# Patient Record
Sex: MALE | Race: WHITE | NOT HISPANIC OR LATINO | Employment: OTHER | URBAN - METROPOLITAN AREA
[De-identification: names, ages, dates, MRNs, and addresses within clinical notes are randomized per-mention and may not be internally consistent; named-entity substitution may affect disease eponyms.]

---

## 2017-01-24 ENCOUNTER — COMMUNICATION - HEALTHEAST (OUTPATIENT)
Dept: ENDOCRINOLOGY | Facility: CLINIC | Age: 62
End: 2017-01-24

## 2017-01-24 DIAGNOSIS — E89.0 OTHER POSTABLATIVE HYPOTHYROIDISM: ICD-10-CM

## 2017-02-23 ENCOUNTER — COMMUNICATION - HEALTHEAST (OUTPATIENT)
Dept: ENDOCRINOLOGY | Facility: CLINIC | Age: 62
End: 2017-02-23

## 2017-02-23 DIAGNOSIS — E89.0 OTHER POSTABLATIVE HYPOTHYROIDISM: ICD-10-CM

## 2017-03-01 ENCOUNTER — COMMUNICATION - HEALTHEAST (OUTPATIENT)
Dept: ENDOCRINOLOGY | Facility: CLINIC | Age: 62
End: 2017-03-01

## 2017-03-01 DIAGNOSIS — E89.0 OTHER POSTABLATIVE HYPOTHYROIDISM: ICD-10-CM

## 2017-03-03 ENCOUNTER — COMMUNICATION - HEALTHEAST (OUTPATIENT)
Dept: ENDOCRINOLOGY | Facility: CLINIC | Age: 62
End: 2017-03-03

## 2017-03-03 DIAGNOSIS — E89.0 OTHER POSTABLATIVE HYPOTHYROIDISM: ICD-10-CM

## 2017-03-10 ENCOUNTER — OFFICE VISIT - HEALTHEAST (OUTPATIENT)
Dept: ENDOCRINOLOGY | Facility: CLINIC | Age: 62
End: 2017-03-10

## 2017-03-10 DIAGNOSIS — E05.00 TOXIC DIFFUSE GOITER: ICD-10-CM

## 2017-03-10 DIAGNOSIS — E89.0 OTHER POSTABLATIVE HYPOTHYROIDISM: ICD-10-CM

## 2017-03-10 LAB
CHOLEST SERPL-MCNC: 159 MG/DL
FASTING STATUS PATIENT QL REPORTED: YES
HBA1C MFR BLD: 5.5 % (ref 3.5–6)
HDLC SERPL-MCNC: 33 MG/DL
LDLC SERPL CALC-MCNC: 114 MG/DL
TRIGL SERPL-MCNC: 61 MG/DL

## 2017-03-17 ENCOUNTER — COMMUNICATION - HEALTHEAST (OUTPATIENT)
Dept: ENDOCRINOLOGY | Facility: CLINIC | Age: 62
End: 2017-03-17

## 2017-10-10 ENCOUNTER — RECORDS - HEALTHEAST (OUTPATIENT)
Dept: LAB | Facility: CLINIC | Age: 62
End: 2017-10-10

## 2017-10-10 LAB
CHOLEST SERPL-MCNC: 171 MG/DL
FASTING STATUS PATIENT QL REPORTED: ABNORMAL
HDLC SERPL-MCNC: 34 MG/DL
LDLC SERPL CALC-MCNC: 92 MG/DL
PSA SERPL-MCNC: 0.3 NG/ML (ref 0–4.5)
TRIGL SERPL-MCNC: 223 MG/DL

## 2017-12-13 ENCOUNTER — COMMUNICATION - HEALTHEAST (OUTPATIENT)
Dept: ENDOCRINOLOGY | Facility: CLINIC | Age: 62
End: 2017-12-13

## 2017-12-13 ENCOUNTER — AMBULATORY - HEALTHEAST (OUTPATIENT)
Dept: ENDOCRINOLOGY | Facility: CLINIC | Age: 62
End: 2017-12-13

## 2017-12-13 DIAGNOSIS — E89.0 POSTABLATIVE HYPOTHYROIDISM: ICD-10-CM

## 2017-12-13 DIAGNOSIS — E05.00 TOXIC DIFFUSE GOITER: ICD-10-CM

## 2017-12-19 ENCOUNTER — AMBULATORY - HEALTHEAST (OUTPATIENT)
Dept: LAB | Facility: CLINIC | Age: 62
End: 2017-12-19

## 2017-12-19 DIAGNOSIS — E89.0 POSTABLATIVE HYPOTHYROIDISM: ICD-10-CM

## 2017-12-21 ENCOUNTER — AMBULATORY - HEALTHEAST (OUTPATIENT)
Dept: ENDOCRINOLOGY | Facility: CLINIC | Age: 62
End: 2017-12-21

## 2017-12-21 DIAGNOSIS — E89.0 POSTABLATIVE HYPOTHYROIDISM: ICD-10-CM

## 2017-12-29 ENCOUNTER — COMMUNICATION - HEALTHEAST (OUTPATIENT)
Dept: ENDOCRINOLOGY | Facility: CLINIC | Age: 62
End: 2017-12-29

## 2017-12-29 ENCOUNTER — AMBULATORY - HEALTHEAST (OUTPATIENT)
Dept: ENDOCRINOLOGY | Facility: CLINIC | Age: 62
End: 2017-12-29

## 2017-12-29 DIAGNOSIS — E89.0 POSTABLATIVE HYPOTHYROIDISM: ICD-10-CM

## 2018-01-05 ENCOUNTER — COMMUNICATION - HEALTHEAST (OUTPATIENT)
Dept: ENDOCRINOLOGY | Facility: CLINIC | Age: 63
End: 2018-01-05

## 2018-01-05 DIAGNOSIS — E89.0 POSTABLATIVE HYPOTHYROIDISM: ICD-10-CM

## 2018-02-16 ENCOUNTER — COMMUNICATION - HEALTHEAST (OUTPATIENT)
Dept: ENDOCRINOLOGY | Facility: CLINIC | Age: 63
End: 2018-02-16

## 2018-02-27 ENCOUNTER — AMBULATORY - HEALTHEAST (OUTPATIENT)
Dept: LAB | Facility: CLINIC | Age: 63
End: 2018-02-27

## 2018-02-27 DIAGNOSIS — E89.0 POSTABLATIVE HYPOTHYROIDISM: ICD-10-CM

## 2018-02-27 LAB
T4 FREE SERPL-MCNC: 1.2 NG/DL (ref 0.7–1.8)
TSH SERPL DL<=0.005 MIU/L-ACNC: 1.71 UIU/ML (ref 0.3–5)

## 2018-03-07 ENCOUNTER — OFFICE VISIT - HEALTHEAST (OUTPATIENT)
Dept: ENDOCRINOLOGY | Facility: CLINIC | Age: 63
End: 2018-03-07

## 2018-03-07 DIAGNOSIS — E05.00 TOXIC DIFFUSE GOITER: ICD-10-CM

## 2018-03-07 DIAGNOSIS — E89.0 POSTABLATIVE HYPOTHYROIDISM: ICD-10-CM

## 2018-07-11 ENCOUNTER — AMBULATORY - HEALTHEAST (OUTPATIENT)
Dept: LAB | Facility: CLINIC | Age: 63
End: 2018-07-11

## 2018-07-11 DIAGNOSIS — E89.0 POSTABLATIVE HYPOTHYROIDISM: ICD-10-CM

## 2018-07-11 LAB
T4 FREE SERPL-MCNC: 1 NG/DL (ref 0.7–1.8)
TSH SERPL DL<=0.005 MIU/L-ACNC: 1.06 UIU/ML (ref 0.3–5)

## 2018-07-13 ENCOUNTER — COMMUNICATION - HEALTHEAST (OUTPATIENT)
Dept: ENDOCRINOLOGY | Facility: CLINIC | Age: 63
End: 2018-07-13

## 2018-07-31 ENCOUNTER — RECORDS - HEALTHEAST (OUTPATIENT)
Dept: LAB | Facility: CLINIC | Age: 63
End: 2018-07-31

## 2018-07-31 LAB
ALBUMIN SERPL-MCNC: 4.4 G/DL (ref 3.5–5)
ALP SERPL-CCNC: 43 U/L (ref 45–120)
ALT SERPL W P-5'-P-CCNC: 36 U/L (ref 0–45)
ANION GAP SERPL CALCULATED.3IONS-SCNC: 10 MMOL/L (ref 5–18)
AST SERPL W P-5'-P-CCNC: 27 U/L (ref 0–40)
BILIRUB SERPL-MCNC: 0.9 MG/DL (ref 0–1)
BUN SERPL-MCNC: 15 MG/DL (ref 8–22)
CALCIUM SERPL-MCNC: 9.7 MG/DL (ref 8.5–10.5)
CHLORIDE BLD-SCNC: 107 MMOL/L (ref 98–107)
CHOLEST SERPL-MCNC: 172 MG/DL
CO2 SERPL-SCNC: 24 MMOL/L (ref 22–31)
CREAT SERPL-MCNC: 0.86 MG/DL (ref 0.7–1.3)
FASTING STATUS PATIENT QL REPORTED: ABNORMAL
GFR SERPL CREATININE-BSD FRML MDRD: >60 ML/MIN/1.73M2
GLUCOSE BLD-MCNC: 98 MG/DL (ref 70–125)
HDLC SERPL-MCNC: 37 MG/DL
LDLC SERPL CALC-MCNC: 118 MG/DL
POTASSIUM BLD-SCNC: 3.8 MMOL/L (ref 3.5–5)
PROT SERPL-MCNC: 7.5 G/DL (ref 6–8)
SODIUM SERPL-SCNC: 141 MMOL/L (ref 136–145)
TRIGL SERPL-MCNC: 85 MG/DL
TSH SERPL DL<=0.005 MIU/L-ACNC: 1.01 UIU/ML (ref 0.3–5)

## 2018-12-18 ENCOUNTER — COMMUNICATION - HEALTHEAST (OUTPATIENT)
Dept: ENDOCRINOLOGY | Facility: CLINIC | Age: 63
End: 2018-12-18

## 2018-12-18 DIAGNOSIS — E89.0 POSTABLATIVE HYPOTHYROIDISM: ICD-10-CM

## 2018-12-18 RX ORDER — ALBUTEROL SULFATE 90 UG/1
AEROSOL, METERED RESPIRATORY (INHALATION)
Refills: 0 | Status: SHIPPED | COMMUNITY
Start: 2018-11-01 | End: 2023-01-05

## 2019-02-28 ENCOUNTER — COMMUNICATION - HEALTHEAST (OUTPATIENT)
Dept: LAB | Facility: CLINIC | Age: 64
End: 2019-02-28

## 2019-02-28 ENCOUNTER — AMBULATORY - HEALTHEAST (OUTPATIENT)
Dept: ENDOCRINOLOGY | Facility: CLINIC | Age: 64
End: 2019-02-28

## 2019-02-28 DIAGNOSIS — E89.0 POSTABLATIVE HYPOTHYROIDISM: ICD-10-CM

## 2019-04-19 ENCOUNTER — COMMUNICATION - HEALTHEAST (OUTPATIENT)
Dept: ENDOCRINOLOGY | Facility: CLINIC | Age: 64
End: 2019-04-19

## 2019-04-19 DIAGNOSIS — E89.0 POSTABLATIVE HYPOTHYROIDISM: ICD-10-CM

## 2019-04-22 ENCOUNTER — AMBULATORY - HEALTHEAST (OUTPATIENT)
Dept: LAB | Facility: CLINIC | Age: 64
End: 2019-04-22

## 2019-04-22 DIAGNOSIS — E89.0 POSTABLATIVE HYPOTHYROIDISM: ICD-10-CM

## 2019-04-22 LAB
T4 FREE SERPL-MCNC: 1 NG/DL (ref 0.7–1.8)
TSH SERPL DL<=0.005 MIU/L-ACNC: 2.24 UIU/ML (ref 0.3–5)

## 2019-04-25 ENCOUNTER — OFFICE VISIT - HEALTHEAST (OUTPATIENT)
Dept: ENDOCRINOLOGY | Facility: CLINIC | Age: 64
End: 2019-04-25

## 2019-04-25 DIAGNOSIS — E89.0 POSTABLATIVE HYPOTHYROIDISM: ICD-10-CM

## 2019-04-25 ASSESSMENT — MIFFLIN-ST. JEOR: SCORE: 1876.27

## 2019-04-27 RX ORDER — TRAMADOL HYDROCHLORIDE 50 MG/1
50 TABLET ORAL 4 TIMES DAILY PRN
Refills: 0 | Status: SHIPPED | COMMUNITY
Start: 2018-12-28 | End: 2023-01-05

## 2019-05-02 ENCOUNTER — AMBULATORY - HEALTHEAST (OUTPATIENT)
Dept: ENDOCRINOLOGY | Facility: CLINIC | Age: 64
End: 2019-05-02

## 2019-05-02 DIAGNOSIS — E89.0 POSTABLATIVE HYPOTHYROIDISM: ICD-10-CM

## 2019-06-13 ENCOUNTER — AMBULATORY - HEALTHEAST (OUTPATIENT)
Dept: LAB | Facility: CLINIC | Age: 64
End: 2019-06-13

## 2019-06-13 DIAGNOSIS — E89.0 POSTABLATIVE HYPOTHYROIDISM: ICD-10-CM

## 2019-06-13 LAB
T4 FREE SERPL-MCNC: 0.9 NG/DL (ref 0.7–1.8)
TSH SERPL DL<=0.005 MIU/L-ACNC: 1.37 UIU/ML (ref 0.3–5)

## 2019-06-14 ENCOUNTER — COMMUNICATION - HEALTHEAST (OUTPATIENT)
Dept: ENDOCRINOLOGY | Facility: CLINIC | Age: 64
End: 2019-06-14

## 2019-07-23 ENCOUNTER — RECORDS - HEALTHEAST (OUTPATIENT)
Dept: LAB | Facility: CLINIC | Age: 64
End: 2019-07-23

## 2019-07-23 LAB
ALBUMIN SERPL-MCNC: 4.2 G/DL (ref 3.5–5)
ALP SERPL-CCNC: 45 U/L (ref 45–120)
ALT SERPL W P-5'-P-CCNC: 40 U/L (ref 0–45)
ANION GAP SERPL CALCULATED.3IONS-SCNC: 8 MMOL/L (ref 5–18)
AST SERPL W P-5'-P-CCNC: 23 U/L (ref 0–40)
BILIRUB SERPL-MCNC: 0.6 MG/DL (ref 0–1)
BUN SERPL-MCNC: 22 MG/DL (ref 8–22)
CALCIUM SERPL-MCNC: 9.2 MG/DL (ref 8.5–10.5)
CHLORIDE BLD-SCNC: 105 MMOL/L (ref 98–107)
CHOLEST SERPL-MCNC: 167 MG/DL
CO2 SERPL-SCNC: 26 MMOL/L (ref 22–31)
CREAT SERPL-MCNC: 0.87 MG/DL (ref 0.7–1.3)
FASTING STATUS PATIENT QL REPORTED: YES
GFR SERPL CREATININE-BSD FRML MDRD: >60 ML/MIN/1.73M2
GLUCOSE BLD-MCNC: 104 MG/DL (ref 70–125)
HDLC SERPL-MCNC: 35 MG/DL
LDLC SERPL CALC-MCNC: 114 MG/DL
POTASSIUM BLD-SCNC: 4 MMOL/L (ref 3.5–5)
PROT SERPL-MCNC: 7.2 G/DL (ref 6–8)
PSA SERPL-MCNC: 0.3 NG/ML (ref 0–4.5)
SODIUM SERPL-SCNC: 139 MMOL/L (ref 136–145)
TRIGL SERPL-MCNC: 89 MG/DL

## 2019-08-16 ENCOUNTER — RECORDS - HEALTHEAST (OUTPATIENT)
Dept: ADMINISTRATIVE | Facility: OTHER | Age: 64
End: 2019-08-16

## 2019-12-27 ENCOUNTER — RECORDS - HEALTHEAST (OUTPATIENT)
Dept: LAB | Facility: CLINIC | Age: 64
End: 2019-12-27

## 2019-12-27 LAB
ALBUMIN SERPL-MCNC: 4.3 G/DL (ref 3.5–5)
ALP SERPL-CCNC: 49 U/L (ref 45–120)
ALT SERPL W P-5'-P-CCNC: 49 U/L (ref 0–45)
ANION GAP SERPL CALCULATED.3IONS-SCNC: 10 MMOL/L (ref 5–18)
AST SERPL W P-5'-P-CCNC: 32 U/L (ref 0–40)
BILIRUB SERPL-MCNC: 0.7 MG/DL (ref 0–1)
BUN SERPL-MCNC: 13 MG/DL (ref 8–22)
CALCIUM SERPL-MCNC: 9.5 MG/DL (ref 8.5–10.5)
CHLORIDE BLD-SCNC: 105 MMOL/L (ref 98–107)
CHOLEST SERPL-MCNC: 165 MG/DL
CO2 SERPL-SCNC: 24 MMOL/L (ref 22–31)
CREAT SERPL-MCNC: 0.89 MG/DL (ref 0.7–1.3)
FASTING STATUS PATIENT QL REPORTED: ABNORMAL
GFR SERPL CREATININE-BSD FRML MDRD: >60 ML/MIN/1.73M2
GLUCOSE BLD-MCNC: 92 MG/DL (ref 70–125)
HDLC SERPL-MCNC: 28 MG/DL
LDLC SERPL CALC-MCNC: 101 MG/DL
POTASSIUM BLD-SCNC: 3.8 MMOL/L (ref 3.5–5)
PROT SERPL-MCNC: 7.6 G/DL (ref 6–8)
SODIUM SERPL-SCNC: 139 MMOL/L (ref 136–145)
TRIGL SERPL-MCNC: 181 MG/DL
TSH SERPL DL<=0.005 MIU/L-ACNC: 3.59 UIU/ML (ref 0.3–5)

## 2020-05-28 ENCOUNTER — COMMUNICATION - HEALTHEAST (OUTPATIENT)
Dept: ENDOCRINOLOGY | Facility: CLINIC | Age: 65
End: 2020-05-28

## 2020-05-28 DIAGNOSIS — E89.0 POSTABLATIVE HYPOTHYROIDISM: ICD-10-CM

## 2020-06-04 ENCOUNTER — COMMUNICATION - HEALTHEAST (OUTPATIENT)
Dept: LAB | Facility: CLINIC | Age: 65
End: 2020-06-04

## 2020-06-04 DIAGNOSIS — E03.9 HYPOTHYROIDISM, UNSPECIFIED: ICD-10-CM

## 2020-06-19 ENCOUNTER — AMBULATORY - HEALTHEAST (OUTPATIENT)
Dept: LAB | Facility: CLINIC | Age: 65
End: 2020-06-19

## 2020-06-19 DIAGNOSIS — E03.9 HYPOTHYROIDISM, UNSPECIFIED: ICD-10-CM

## 2020-06-19 LAB
CREAT SERPL-MCNC: 0.84 MG/DL (ref 0.7–1.3)
GFR SERPL CREATININE-BSD FRML MDRD: >60 ML/MIN/1.73M2
POTASSIUM BLD-SCNC: 3.6 MMOL/L (ref 3.5–5)
T4 FREE SERPL-MCNC: 1 NG/DL (ref 0.7–1.8)
TSH SERPL DL<=0.005 MIU/L-ACNC: 1.29 UIU/ML (ref 0.3–5)

## 2020-06-22 LAB — 25(OH)D3 SERPL-MCNC: 30.2 NG/ML (ref 30–80)

## 2020-06-25 ENCOUNTER — OFFICE VISIT - HEALTHEAST (OUTPATIENT)
Dept: ENDOCRINOLOGY | Facility: CLINIC | Age: 65
End: 2020-06-25

## 2020-06-25 DIAGNOSIS — E89.0 POSTABLATIVE HYPOTHYROIDISM: ICD-10-CM

## 2020-07-25 ENCOUNTER — COMMUNICATION - HEALTHEAST (OUTPATIENT)
Dept: ENDOCRINOLOGY | Facility: CLINIC | Age: 65
End: 2020-07-25

## 2020-07-25 DIAGNOSIS — E89.0 POSTABLATIVE HYPOTHYROIDISM: ICD-10-CM

## 2020-08-20 ENCOUNTER — RECORDS - HEALTHEAST (OUTPATIENT)
Dept: LAB | Facility: CLINIC | Age: 65
End: 2020-08-20

## 2020-08-20 ENCOUNTER — RECORDS - HEALTHEAST (OUTPATIENT)
Dept: ADMINISTRATIVE | Facility: OTHER | Age: 65
End: 2020-08-20

## 2020-08-20 LAB
ALBUMIN SERPL-MCNC: 4.1 G/DL (ref 3.5–5)
ALP SERPL-CCNC: 43 U/L (ref 45–120)
ALT SERPL W P-5'-P-CCNC: 30 U/L (ref 0–45)
ANION GAP SERPL CALCULATED.3IONS-SCNC: 10 MMOL/L (ref 5–18)
AST SERPL W P-5'-P-CCNC: 18 U/L (ref 0–40)
BILIRUB SERPL-MCNC: 0.5 MG/DL (ref 0–1)
BUN SERPL-MCNC: 20 MG/DL (ref 8–22)
CALCIUM SERPL-MCNC: 9.2 MG/DL (ref 8.5–10.5)
CHLORIDE BLD-SCNC: 106 MMOL/L (ref 98–107)
CHOLEST SERPL-MCNC: 168 MG/DL
CO2 SERPL-SCNC: 23 MMOL/L (ref 22–31)
CREAT SERPL-MCNC: 0.82 MG/DL (ref 0.7–1.3)
FASTING STATUS PATIENT QL REPORTED: ABNORMAL
GFR SERPL CREATININE-BSD FRML MDRD: >60 ML/MIN/1.73M2
GLUCOSE BLD-MCNC: 113 MG/DL (ref 70–125)
HDLC SERPL-MCNC: 39 MG/DL
LDLC SERPL CALC-MCNC: 116 MG/DL
POTASSIUM BLD-SCNC: 4.1 MMOL/L (ref 3.5–5)
PROT SERPL-MCNC: 7 G/DL (ref 6–8)
PSA SERPL-MCNC: 0.4 NG/ML (ref 0–4.5)
SODIUM SERPL-SCNC: 139 MMOL/L (ref 136–145)
TRIGL SERPL-MCNC: 63 MG/DL
TSH SERPL DL<=0.005 MIU/L-ACNC: 1.11 UIU/ML (ref 0.3–5)

## 2021-02-12 ENCOUNTER — COMMUNICATION - HEALTHEAST (OUTPATIENT)
Dept: ENDOCRINOLOGY | Facility: CLINIC | Age: 66
End: 2021-02-12

## 2021-02-12 DIAGNOSIS — E89.0 POSTABLATIVE HYPOTHYROIDISM: ICD-10-CM

## 2021-02-12 RX ORDER — LEVOTHYROXINE SODIUM 75 UG/1
TABLET ORAL
Qty: 102 TABLET | Refills: 0 | Status: SHIPPED | OUTPATIENT
Start: 2021-02-12 | End: 2022-06-29

## 2021-05-25 ENCOUNTER — RECORDS - HEALTHEAST (OUTPATIENT)
Dept: LAB | Facility: CLINIC | Age: 66
End: 2021-05-25

## 2021-05-25 LAB
ALBUMIN SERPL-MCNC: 4.2 G/DL (ref 3.5–5)
ALP SERPL-CCNC: 47 U/L (ref 45–120)
ALT SERPL W P-5'-P-CCNC: 46 U/L (ref 0–45)
ANION GAP SERPL CALCULATED.3IONS-SCNC: 11 MMOL/L (ref 5–18)
AST SERPL W P-5'-P-CCNC: 26 U/L (ref 0–40)
BILIRUB SERPL-MCNC: 0.8 MG/DL (ref 0–1)
BUN SERPL-MCNC: 15 MG/DL (ref 8–22)
CALCIUM SERPL-MCNC: 8.9 MG/DL (ref 8.5–10.5)
CHLORIDE BLD-SCNC: 104 MMOL/L (ref 98–107)
CHOLEST SERPL-MCNC: 177 MG/DL
CO2 SERPL-SCNC: 23 MMOL/L (ref 22–31)
CREAT SERPL-MCNC: 0.86 MG/DL (ref 0.7–1.3)
FASTING STATUS PATIENT QL REPORTED: YES
GFR SERPL CREATININE-BSD FRML MDRD: >60 ML/MIN/1.73M2
GLUCOSE BLD-MCNC: 112 MG/DL (ref 70–125)
HDLC SERPL-MCNC: 36 MG/DL
LDLC SERPL CALC-MCNC: 125 MG/DL
POTASSIUM BLD-SCNC: 4 MMOL/L (ref 3.5–5)
PROT SERPL-MCNC: 7.1 G/DL (ref 6–8)
SODIUM SERPL-SCNC: 138 MMOL/L (ref 136–145)
TRIGL SERPL-MCNC: 80 MG/DL
TSH SERPL DL<=0.005 MIU/L-ACNC: 3.16 UIU/ML (ref 0.3–5)

## 2021-05-26 ENCOUNTER — RECORDS - HEALTHEAST (OUTPATIENT)
Dept: ADMINISTRATIVE | Facility: CLINIC | Age: 66
End: 2021-05-26

## 2021-05-28 NOTE — PROGRESS NOTES
[facility]  ENDOCRINOLOGY    Thyroid Note  4/27/2019    Thu Sanches, 1955, [id number]          Reason for visit      1. Postablative hypothyroidism        HPI     Thu Sanches is a very pleasant 63 y.o. old male who presents for follow up.  SUMMARY:  Thu returns today in f/u for Postablative hypothyroidism.  He reports today that he is experiencing increased fatigue, and that he has put on weight for which he cannot account.  His TSH is currently 2.24, which is significantly higher than his last level.  He has been taking 75 mcg of Levothyroxine daily on an empty stomach. He is having no problems referable to his neck at present. He has also noted no changes in his eyes.        TODAY:    Past Medical History     Patient Active Problem List   Diagnosis     Hypertension     Allergic Rhinitis     Thyrotoxic exophthalmos     Graves' Disease     Postablative hypothyroidism       Family History       family history is not on file.    Social History      reports that he has never smoked. He has never used smokeless tobacco.      Review of Systems     Patient denies fatigue, weight changes, heat/cold intolerance, bowel/skin changes or CVS symptoms.   Remainder per HPI and per attached intake form.      Vital Signs     /76   Pulse 80   Ht 6' (1.829 m)   Wt (!) 230 lb (104.3 kg)   BMI 31.19 kg/m    Wt Readings from Last 3 Encounters:   04/25/19 (!) 230 lb (104.3 kg)   03/07/18 (!) 235 lb 3.2 oz (106.7 kg)   03/10/17 (!) 225 lb (102.1 kg)       Physical Exam     General:  Normal, NIRD,appears euthyroid  Eyes:  Pupils equal, round and reactive to light; mild proptosis, no lid lag or  periorbital edema.  Thyroid:  Thyroid is normal.  No tenderness or bruit  Neck: No lymph nodes  Musculoskeletal:  Muscle strength grossly normal without evidence of wasting.  Heart:  Regular rate and rhythm without murmur.  Lungs:  Clear to auscultation.  Abdomen: Soft, non-tender, no masses or  organomegaly  Neuro: Patella Reflexes were normal.No tremors  Skin:  No acanthosis nigricans or vitiligo        Assessment     1. Postablative hypothyroidism            Plan       Increased Levothyroxine by one extra tablet of Levothyroxine 75 mcg weekly.  He will recheck his TSH and Free T 4 in 8 weeks.  Refill provided.  He will f/u with me in 1 year. Time spent with pt today: 25 min with >50% spent in counseling and coordination of care.        Belén Jacobo  [facility]  4/27/2019  5:03 PM      Lab Results     TSH   Date Value Ref Range Status   04/22/2019 2.24 0.30 - 5.00 uIU/mL Final     T3, Total   Date Value Ref Range Status   04/01/2014 100 45 - 175 ng/dL Final     T3, Free   Date Value Ref Range Status   10/03/2013 3.9 1.9 - 3.9 pg/mL Final     No results found for: THYROIDAB    No results found for: [id number]    Imaging Results   Last thyroid ultrasound:  No results found for this or any previous visit.    Last thyroid nuclear scan:  No results found for this or any previous visit.    Current Medications     Outpatient Medications Prior to Visit   Medication Sig Dispense Refill     fluticasone (FLONASE) 50 mcg/actuation nasal spray 2 sprays into each nostril as needed for rhinitis.       losartan-hydrochlorothiazide (HYZAAR) 50-12.5 mg per tablet Take 1 tablet by mouth daily.       methylPREDNISolone (MEDROL DOSEPACK) 4 mg tablet TAKE 6 TABLETS ON DAY 1 AS DIRECTED ON PACKAGE AND DECREASE BY 1 TAB EACH DAY FOR A TOTAL OF 6 DAYS  2     PROAIR HFA 90 mcg/actuation inhaler INHALE 2 PUFFS 4 TIMES A DAY AS NEEDED FOR COUGH INHALED  0     levothyroxine (SYNTHROID, LEVOTHROID) 75 MCG tablet Take 1 tablet (75 mcg total) by mouth daily. 30 tablet 0     naproxen (NAPROSYN) 375 MG tablet Take 375 mg by mouth 2 (two) times a day.  0     traMADol (ULTRAM) 50 mg tablet Take 50 mg by mouth 4 (four) times a day as needed.  0     No facility-administered medications prior to visit.

## 2021-05-29 NOTE — TELEPHONE ENCOUNTER
----- Message from Belén Jacobo NP sent at 6/14/2019 12:13 PM CDT -----  Please let pt know that his labs are normal, and that we will discuss them at his appointment

## 2021-05-30 VITALS — WEIGHT: 225 LBS

## 2021-05-30 NOTE — TELEPHONE ENCOUNTER
Pt called back and I informed of Deyanira's message below. Pt understands and I clarified no changes to his medications.

## 2021-06-01 VITALS — WEIGHT: 235.2 LBS

## 2021-06-03 VITALS — WEIGHT: 230 LBS | HEIGHT: 72 IN | BODY MASS INDEX: 31.15 KG/M2

## 2021-06-08 NOTE — TELEPHONE ENCOUNTER
F/u appointment scheduled for 06.25.2020 and lab only scheduled for 06.15.2020.    Please bridge medication asap.

## 2021-06-09 NOTE — PROGRESS NOTES
[facility]  ENDOCRINOLOGY    Thyroid Note  3/16/2017    Thu Sanches, 1955, [id number]          Reason for visit      1. Other postablative hypothyroidism    2. Graves' Disease        HPI     Thu Sanches is a very pleasant 61 y.o. old male who presents for follow up.  SUMMARY:  Thu returns today in f/u for Graves' Disease that is now into post ablative hypothyroidism.  He reports today that he is feeling good.  He is due for an eye exam with his Ophthalmologist. He also tells me that he had pneumonia about a month ago.  He is having no problems referable to his throat, no difficulty swallowing, or sensation of compression.  Current TSH is 4.49 and Free T4 is 0.9.          Past Medical History     Patient Active Problem List   Diagnosis     Hypertension     Allergic Rhinitis     Thyrotoxic exophthalmos     Graves' Disease     Other postablative hypothyroidism       Family History       family history is not on file.    Social History      reports that he has never smoked. He has never used smokeless tobacco.      Review of Systems     Patient denies fatigue, weight changes, heat/cold intolerance, bowel/skin changes or CVS symptoms.   Remainder per HPI and per attached intake form.      Vital Signs     Visit Vitals     /84     Pulse 82     Wt (!) 225 lb (102.1 kg)     Wt Readings from Last 3 Encounters:   03/10/17 (!) 225 lb (102.1 kg)   01/08/16 (!) 236 lb (107 kg)   07/07/15 (!) 229 lb 3.2 oz (104 kg)       Physical Exam     General:  Normal, NIRD,appears euthyroid  Eyes:  Pupils equal, round and reactive to light; no proptosis, lid lag or  periorbital edema.  Thyroid:  Thyroid is normal.  No tenderness or bruit  Neck: No lymph nodes  Musculoskeletal:  Muscle strength grossly normal without evidence of wasting.  Heart:  Regular rate and rhythm without murmur.  Lungs:  Clear to auscultation.  Abdomen: Soft, non-tender, no masses or organomegaly  Neuro: Patella Reflexes were  normal.No tremors  Skin:  No acanthosis nigricans or vitiligo          Assessment     1. Other postablative hypothyroidism    2. Graves' Disease            Plan       Pt is bio-physically and physically euthyroid.  Will continue on current dosage of Levothyroxine at 25 mcg.  He will return in 1 year.  Time spent with pt today:25 min with > 50% spent in counseling and coordination of care.      Belén MODI Endocrinology  3/16/2017  10:38 AM      Lab Results     TSH   Date Value Ref Range Status   03/10/2017 4.40 0.30 - 5.00 uIU/mL Final     T3, TOTAL   Date Value Ref Range Status   04/01/2014 100 45 - 175 ng/dL Final     T3, FREE   Date Value Ref Range Status   10/03/2013 3.9 1.9 - 3.9 pg/mL Final     No results found for: THYROIDAB    No results found for: P0ERVKG    Imaging Results   Last thyroid ultrasound:  No results found for this or any previous visit.    Last thyroid nuclear scan:  Results for orders placed during the hospital encounter of 06/19/13   NM Throid Uptake and Scan    Narrative See Historical Hospital Medical Record for documentation       Current Medications     Outpatient Medications Prior to Visit   Medication Sig Dispense Refill     fluticasone (FLONASE) 50 mcg/actuation nasal spray 2 sprays into each nostril as needed for rhinitis.       losartan-hydrochlorothiazide (HYZAAR) 50-12.5 mg per tablet Take 1 tablet by mouth daily.       levothyroxine (SYNTHROID, LEVOTHROID) 25 MCG tablet TAKE 1 TABLET (25 MCG TOTAL) BY MOUTH DAILY. 30 tablet 0     No facility-administered medications prior to visit.

## 2021-06-09 NOTE — PROGRESS NOTES
"Thu Sanches is a 64 y.o. male who is being evaluated via a billable video visit.      The patient has been notified of following:     \"This video visit will be conducted via a call between you and your physician/provider. We have found that certain health care needs can be provided without the need for an in-person physical exam.  This service lets us provide the care you need with a video conversation.  If a prescription is necessary we can send it directly to your pharmacy.  If lab work is needed we can place an order for that and you can then stop by our lab to have the test done at a later time.    Video visits are billed at different rates depending on your insurance coverage. Please reach out to your insurance provider with any questions.    If during the course of the call the physician/provider feels a video visit is not appropriate, you will not be charged for this service.\"    Patient has given verbal consent to a Video visit? Yes    How would you like to obtain your AVS? AVS Preference: [employer].  Patient would like the video invitation sent by phone    Will anyone else be joining your video visit? No        Video Start Time: 0825    Additional provider notes:       Reason for visit      1. Postablative hypothyroidism        HPI     Thu Sanches is a very pleasant 64 y.o. old male who presents for follow up.  SUMMARY:    Letitia is contacted today via Video Visit in f/u for postablative Hypothyroidism. He reports that his energy levels are OK. He will be retiring from  after 42 years in a few months. He has been going to work and has not been at home during COVID restrictions. He continues to take Levothyroxine 75 mcg daily 6 days a week and 150 mcg on the 7th.  His current TSH is 1.29 and fT4 is 1.0.  He is having no problems referable to his neck.     Past Medical History     Patient Active Problem List   Diagnosis     Hypertension     Allergic Rhinitis     Thyrotoxic exophthalmos "     Graves' Disease     Postablative hypothyroidism       Family History       family history is not on file.    Social History      reports that he has never smoked. He has never used smokeless tobacco.      Review of Systems     Patient denies fatigue, weight changes, heat/cold intolerance, bowel/skin changes or CVS symptoms.   Remainder per HPI and per attached intake form.      Vital Signs     There were no vitals taken for this visit.  Wt Readings from Last 3 Encounters:   04/25/19 (!) 230 lb (104.3 kg)   03/07/18 (!) 235 lb 3.2 oz (106.7 kg)   03/10/17 (!) 225 lb (102.1 kg)           Assessment     1. Postablative hypothyroidism            Plan       Pt is bio-chemically and physically euthyroid. He will continue taking Levothyroxine 75 mg daily 6 days a week and 150 mcg on the 7th. F/u with me in 1 year, sooner with problems or concerns.           Lab Results     TSH   Date Value Ref Range Status   06/19/2020 1.29 0.30 - 5.00 uIU/mL Final     T3, Total   Date Value Ref Range Status   04/01/2014 100 45 - 175 ng/dL Final     T3, Free   Date Value Ref Range Status   10/03/2013 3.9 1.9 - 3.9 pg/mL Final     No results found for: THYROIDAB    No results found for: [id number]    Imaging Results   Last thyroid ultrasound:  No results found for this or any previous visit.    Last thyroid nuclear scan:  No results found for this or any previous visit.    Current Medications     Outpatient Medications Prior to Visit   Medication Sig Dispense Refill     fluticasone (FLONASE) 50 mcg/actuation nasal spray 2 sprays into each nostril as needed for rhinitis.       losartan-hydrochlorothiazide (HYZAAR) 50-12.5 mg per tablet Take 1 tablet by mouth daily.       methylPREDNISolone (MEDROL DOSEPACK) 4 mg tablet TAKE 6 TABLETS ON DAY 1 AS DIRECTED ON PACKAGE AND DECREASE BY 1 TAB EACH DAY FOR A TOTAL OF 6 DAYS  2     naproxen (NAPROSYN) 375 MG tablet Take 375 mg by mouth 2 (two) times a day.  0     PROAIR HFA 90 mcg/actuation inhaler  INHALE 2 PUFFS 4 TIMES A DAY AS NEEDED FOR COUGH INHALED  0     traMADol (ULTRAM) 50 mg tablet Take 50 mg by mouth 4 (four) times a day as needed.  0     levothyroxine (SYNTHROID, LEVOTHROID) 75 MCG tablet Take one tablet daily 6 days a week and 2 tablets on the 7th 38 tablet 0     No facility-administered medications prior to visit.            Video-Visit Details    Type of service:  Video Visit    Video End Time (time video stopped): 0842  Originating Location (pt. Location): work    Distant Location (provider location):  [facility] ENDOCRINOLOGY     Platform used for Video Visit: Raina EVANS

## 2021-06-15 ENCOUNTER — AMBULATORY - HEALTHEAST (OUTPATIENT)
Dept: ENDOCRINOLOGY | Facility: CLINIC | Age: 66
End: 2021-06-15

## 2021-06-15 ENCOUNTER — COMMUNICATION - HEALTHEAST (OUTPATIENT)
Dept: LAB | Facility: CLINIC | Age: 66
End: 2021-06-15

## 2021-06-15 DIAGNOSIS — E89.0 POSTABLATIVE HYPOTHYROIDISM: ICD-10-CM

## 2021-06-16 NOTE — PROGRESS NOTES
[facility]  ENDOCRINOLOGY    Thyroid Note  3/7/2018    Thu Sanches, 1955, [id number]          Reason for visit      1. Postablative hypothyroidism    2. Graves' Disease        HPI     Thu Sanches is a very pleasant 62 y.o. old male who presents for follow up.  SUMMARY:  Thu is a 59-year-old man who with Graves' disease who was treated with radioactive iodine in July 2013.  He subsequently became euthyroid and has not been on thyroid hormone replacement.  He also has Graves ophthalmopathy for which she sees Dr. Espinal on a regular basis.  He notes that his eyes are less protuberant and in the past.  Over the last year he has noticed that he's become slightly more tired, he's gained weight, he is a little bit more cold when he was in the past and he has muscle cramps  TODAY:  Letitia returns today in f/u for Postablative hypothyroidism. Letitia's biggest concern about this whole thyroid thing has to do with weight gain. He is up about 20 lbs higher than he wants to be.  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  He no longer plays Basketball and doesn't really get an increased heart rate with what he is doing, movement wise.  He is looking into [employer].  He is taking Levothyroxine 75 mcg daily on an empty stomach.  His current TSH is 1.71 and Free T 4 is 1.2. He feels that his energy level is where it should be.    Past Medical History     Patient Active Problem List   Diagnosis     Hypertension     Allergic Rhinitis     Thyrotoxic exophthalmos     Graves' Disease     Postablative hypothyroidism       Family History       family history is not on file.    Social History      reports that he has never smoked. He has never used smokeless tobacco.      Review of Systems     Patient denies fatigue, weight changes, heat/cold intolerance, bowel/skin changes or CVS symptoms.   Remainder per HPI and per attached intake form.      Vital Signs     /80  (Patient Site: Right Arm, Patient Position: Sitting, Cuff Size: Adult Regular)  Pulse 72  Wt (!) 235 lb 3.2 oz (106.7 kg)  Wt Readings from Last 3 Encounters:   03/07/18 (!) 235 lb 3.2 oz (106.7 kg)   03/10/17 (!) 225 lb (102.1 kg)   01/08/16 (!) 236 lb (107 kg)       Physical Exam     General:  Normal, NIRD,appears euthyroid  Eyes:  Pupils equal, round and reactive to light; no proptosis, lid lag or  periorbital edema.  Thyroid:  Thyroid is normal.  No tenderness or bruit  Neck: No lymph nodes  Musculoskeletal:  Muscle strength grossly normal without evidence of wasting.  Heart:  Regular rate and rhythm without murmur.  Lungs:  Clear to auscultation.  Abdomen: Soft, non-tender, no masses or organomegaly  Neuro: Patella Reflexes were normal.No tremors  Skin:  No acanthosis nigricans or vitiligo        Assessment     1. Postablative hypothyroidism    2. Graves' Disease            Plan       Letitia is bio-chemically and physically euthyroid.  He is motivated to lose weight and I have suggested that he ramp up his activity a bit more so he is doing regular cardio.  He will f/u with me in 1 year. Time spent with pt today: 25 min with >50% spent in counseling and coordination of care.        Belén MODI Endocrinology  3/7/2018  10:27 AM      Lab Results     TSH   Date Value Ref Range Status   02/27/2018 1.71 0.30 - 5.00 uIU/mL Final     T3, Total   Date Value Ref Range Status   04/01/2014 100 45 - 175 ng/dL Final     T3, Free   Date Value Ref Range Status   10/03/2013 3.9 1.9 - 3.9 pg/mL Final     No results found for: THYROIDAB    No results found for: [id number]    Imaging Results   Last thyroid ultrasound:  No results found for this or any previous visit.    Last thyroid nuclear scan:  Results for orders placed during the hospital encounter of 06/19/13   NM Throid Uptake and Scan    Narrative See Historical Hospital Medical Record for documentation       Current Medications     Outpatient Medications Prior to  Visit   Medication Sig Dispense Refill     fluticasone (FLONASE) 50 mcg/actuation nasal spray 2 sprays into each nostril as needed for rhinitis.       losartan-hydrochlorothiazide (HYZAAR) 50-12.5 mg per tablet Take 1 tablet by mouth daily.       methylPREDNISolone (MEDROL DOSEPACK) 4 mg tablet TAKE 6 TABLETS ON DAY 1 AS DIRECTED ON PACKAGE AND DECREASE BY 1 TAB EACH DAY FOR A TOTAL OF 6 DAYS  2     azithromycin (ZITHROMAX) 250 MG tablet TAKE 2 TABLETS BY MOUTH TODAY, THEN TAKE 1 TABLET DAILY FOR 4 DAYS  0     levothyroxine (SYNTHROID, LEVOTHROID) 75 MCG tablet Take 1 tablet (75 mcg total) by mouth daily. 90 tablet 0     ZOSTAVAX, PF, 19,400 unit/0.65 mL injection USE AS DIRECTED  0     No facility-administered medications prior to visit.

## 2021-06-22 ENCOUNTER — COMMUNICATION - HEALTHEAST (OUTPATIENT)
Dept: ADMINISTRATIVE | Facility: CLINIC | Age: 66
End: 2021-06-22

## 2021-06-23 ENCOUNTER — AMBULATORY - HEALTHEAST (OUTPATIENT)
Dept: LAB | Facility: CLINIC | Age: 66
End: 2021-06-23

## 2021-06-23 DIAGNOSIS — E89.0 POSTABLATIVE HYPOTHYROIDISM: ICD-10-CM

## 2021-06-23 LAB — T4 FREE SERPL-MCNC: 1 NG/DL (ref 0.7–1.8)

## 2021-06-24 ENCOUNTER — RECORDS - HEALTHEAST (OUTPATIENT)
Dept: ENDOCRINOLOGY | Facility: CLINIC | Age: 66
End: 2021-06-24

## 2021-06-24 LAB — 25(OH)D3 SERPL-MCNC: 26.7 NG/ML (ref 30–80)

## 2021-07-05 PROBLEM — G47.33 OBSTRUCTIVE SLEEP APNEA: Status: ACTIVE | Noted: 2021-06-30

## 2021-07-05 PROBLEM — E78.6 LIPOPROTEIN DEFICIENCY DISORDER: Status: ACTIVE | Noted: 2021-06-30

## 2021-07-05 PROBLEM — E05.90 THYROTOXICOSIS: Status: ACTIVE | Noted: 2021-06-30

## 2021-07-05 PROBLEM — E66.9 OBESITY WITH BODY MASS INDEX 30 OR GREATER: Status: ACTIVE | Noted: 2021-06-30

## 2021-07-05 PROBLEM — E03.9 HYPOTHYROIDISM: Status: ACTIVE | Noted: 2021-06-30

## 2021-07-15 NOTE — TELEPHONE ENCOUNTER
"Patient has a future lab appointment on 3/6/19 for \"labs-Deyanira\". There are no lab orders. Could you please review the patient's chart and place orders?    If no lab orders are needed at this time, please forward this message to [facility] Registration Pool. They will then call the patient and inform them that no labs are needed at this time per provider.     Thanks    "

## 2021-07-15 NOTE — TELEPHONE ENCOUNTER
[facility] Deyan[facility]. Darius only on 03.06.2019 has been cx. Lab only needs to be completed 1 week before any future appointment w/Davin

## 2021-07-15 NOTE — TELEPHONE ENCOUNTER
Deyanira team    Patient did labs with PCP in may. Ok to use those labs for upcoming appt?    Or should he still do additional labs?      He can be reached @ [phone]

## 2021-07-15 NOTE — TELEPHONE ENCOUNTER
Spoke with provider - prefers Free T4 and Vitamin D to be done as well.  Spoke with patient.  Lab appointment scheduled.  Future lab orders were placed last week.

## 2021-07-27 ENCOUNTER — LAB (OUTPATIENT)
Dept: LAB | Facility: CLINIC | Age: 66
End: 2021-07-27
Payer: COMMERCIAL

## 2021-07-27 DIAGNOSIS — E89.0 POSTABLATIVE HYPOTHYROIDISM: ICD-10-CM

## 2021-07-27 LAB
ANION GAP SERPL CALCULATED.3IONS-SCNC: 10 MMOL/L (ref 5–18)
BUN SERPL-MCNC: 16 MG/DL (ref 8–22)
CALCIUM SERPL-MCNC: 9.8 MG/DL (ref 8.5–10.5)
CHLORIDE BLD-SCNC: 103 MMOL/L (ref 98–107)
CO2 SERPL-SCNC: 26 MMOL/L (ref 22–31)
CREAT SERPL-MCNC: 0.9 MG/DL (ref 0.7–1.3)
GFR SERPL CREATININE-BSD FRML MDRD: >90 ML/MIN/1.73M2
GLUCOSE BLD-MCNC: 95 MG/DL (ref 70–125)
POTASSIUM BLD-SCNC: 4 MMOL/L (ref 3.5–5)
SODIUM SERPL-SCNC: 139 MMOL/L (ref 136–145)
T4 FREE SERPL-MCNC: 1.15 NG/DL (ref 0.7–1.8)
TSH SERPL DL<=0.005 MIU/L-ACNC: 1.42 UIU/ML (ref 0.3–5)

## 2021-07-27 PROCEDURE — 80048 BASIC METABOLIC PNL TOTAL CA: CPT

## 2021-07-27 PROCEDURE — 36415 COLL VENOUS BLD VENIPUNCTURE: CPT

## 2021-07-27 PROCEDURE — 84439 ASSAY OF FREE THYROXINE: CPT

## 2021-07-27 PROCEDURE — 82306 VITAMIN D 25 HYDROXY: CPT

## 2021-07-27 PROCEDURE — 84443 ASSAY THYROID STIM HORMONE: CPT

## 2021-07-28 LAB — DEPRECATED CALCIDIOL+CALCIFEROL SERPL-MC: 29 UG/L (ref 30–80)

## 2021-07-29 DIAGNOSIS — E55.9 VITAMIN D DEFICIENCY: Primary | ICD-10-CM

## 2021-11-02 ENCOUNTER — LAB (OUTPATIENT)
Dept: LAB | Facility: CLINIC | Age: 66
End: 2021-11-02
Payer: COMMERCIAL

## 2021-11-02 DIAGNOSIS — E55.9 VITAMIN D DEFICIENCY: ICD-10-CM

## 2021-11-02 LAB
ANION GAP SERPL CALCULATED.3IONS-SCNC: 11 MMOL/L (ref 5–18)
BUN SERPL-MCNC: 13 MG/DL (ref 8–22)
CALCIUM SERPL-MCNC: 9.6 MG/DL (ref 8.5–10.5)
CHLORIDE BLD-SCNC: 103 MMOL/L (ref 98–107)
CO2 SERPL-SCNC: 25 MMOL/L (ref 22–31)
CREAT SERPL-MCNC: 0.89 MG/DL (ref 0.7–1.3)
GFR SERPL CREATININE-BSD FRML MDRD: 90 ML/MIN/1.73M2
GLUCOSE BLD-MCNC: 109 MG/DL (ref 70–125)
POTASSIUM BLD-SCNC: 3.9 MMOL/L (ref 3.5–5)
SODIUM SERPL-SCNC: 139 MMOL/L (ref 136–145)
T4 FREE SERPL-MCNC: 1.13 NG/DL (ref 0.7–1.8)
TSH SERPL DL<=0.005 MIU/L-ACNC: 1.91 UIU/ML (ref 0.3–5)

## 2021-11-02 PROCEDURE — 80048 BASIC METABOLIC PNL TOTAL CA: CPT

## 2021-11-02 PROCEDURE — 82306 VITAMIN D 25 HYDROXY: CPT

## 2021-11-02 PROCEDURE — 84443 ASSAY THYROID STIM HORMONE: CPT

## 2021-11-02 PROCEDURE — 84439 ASSAY OF FREE THYROXINE: CPT

## 2021-11-02 PROCEDURE — 36415 COLL VENOUS BLD VENIPUNCTURE: CPT

## 2021-11-02 NOTE — PROGRESS NOTES
Letitia is a 66 year old who is being evaluated via a billable video visit.      How would you like to obtain your AVS? MyChart  If the video visit is dropped, the invitation should be resent by: Text to cell phone: [phone]  Will anyone else be joining your video visit? No      Video Start Time: 1000    [facility] ENDOCRINOLOGY    Thyroid Note  11/9/2021    Thu Sanches, 1955, [id number]          Reason for visit      1. Postablative hypothyroidism    2. Vitamin D deficiency        HPI     Thu Sanches is a very pleasant 66 year old old male who presents for follow up.  SUMMARY:    Letitia is contacted today in f/u for Postablative Hypothyroidism. His current TSH is 1.92 and fT4 is 1.1. He notes that he is currently experiencing some fatigue. He is taking Levothyroxine 75 mcg 5 days a week and 150 mcg two days a week. He is having no problems referable to his neck.     His current Vit D level is 34, and improved from the 29 of 3 months ago. He is not always taking his supplement because he wasn't sure that he could take it with his Losartan.    Past Medical History     Patient Active Problem List   Diagnosis     Hypertension     Allergic Rhinitis     Thyrotoxic exophthalmos     Graves' Disease     Postablative hypothyroidism     Hypothyroidism     Lipoprotein deficiency disorder     Obesity with body mass index 30 or greater     Obstructive sleep apnea     Thyrotoxicosis     Benign neoplasm of ascending colon       Family History       family history is not on file.    Social History      reports that he has never smoked. He has never used smokeless tobacco.      Review of Systems     Patient denies fatigue, weight changes, heat/cold intolerance, bowel/skin changes or CVS symptoms.   Remainder per HPI and per attached intake form.      Vital Signs     There were no vitals taken for this visit.  Wt Readings from Last 3 Encounters:   04/25/19 104.3 kg (230 lb)   03/07/18 106.7 kg  (235 lb 3.2 oz)   03/10/17 102.1 kg (225 lb)       Physical Exam     Constitutional:  Well developed, Well nourished  HENT:  Normocephalic,   Neck: normal in appearance  Eyes:  PERRL, Conjunctiva pink  Respiratory:  No respiratory distress  Skin: No acanthosis nigricans, lipoatrophy or lipodystrophy  Neurologic:  Alert & oriented x 3, nonfocal  Psychiatric:  Affect, Mood, Insight appropriate          Assessment     1. Postablative hypothyroidism    2. Vitamin D deficiency            Plan     Letitia is going to try taking 150 mcg 3 days a week and 75 mcg 4 days a week. He is going to take the Vit D with his Losartan in the morning. F/u labs in 2 months. F/u with me in 1 year.         Belén Jacobo NP  [facility]  11/9/2021  12:51 PM        Lab Results     TSH   Date Value Ref Range Status   11/02/2021 1.91 0.30 - 5.00 uIU/mL Final     No components found for: THYROIDAB    No results found for: [id number]    Imaging Results   Last thyroid ultrasound:  No results found for this or any previous visit.      Last thyroid nuclear scan:  Results for orders placed in visit on 06/19/13    NM Thyroid Uptake and Scan    Narrative  See Historical Hospital Medical Record for documentation      Current Medications     Outpatient Medications Prior to Visit   Medication Sig Dispense Refill     fluticasone (FLONASE) 50 mcg/actuation nasal spray [FLUTICASONE (FLONASE) 50 MCG/ACTUATION NASAL SPRAY] 2 sprays into each nostril as needed for rhinitis.       levothyroxine (SYNTHROID, LEVOTHROID) 75 MCG tablet [LEVOTHYROXINE (SYNTHROID, LEVOTHROID) 75 MCG TABLET] TAKE 1 TABLET EVERY DAY 6 DAYS A WEEK AND TAKE 2 TABLETS ON THE 7TH  tablet 0     losartan-hydrochlorothiazide (HYZAAR) 50-12.5 mg per tablet [LOSARTAN-HYDROCHLOROTHIAZIDE (HYZAAR) 50-12.5 MG PER TABLET] Take 1 tablet by mouth daily.       methylPREDNISolone (MEDROL DOSEPACK) 4 mg tablet [METHYLPREDNISOLONE (MEDROL DOSEPACK) 4 MG TABLET] TAKE 6 TABLETS ON DAY 1 AS  DIRECTED ON PACKAGE AND DECREASE BY 1 TAB EACH DAY FOR A TOTAL OF 6 DAYS  2     naproxen (NAPROSYN) 375 MG tablet [NAPROXEN (NAPROSYN) 375 MG TABLET] Take 375 mg by mouth 2 (two) times a day.  0     PROAIR HFA 90 mcg/actuation inhaler [PROAIR HFA 90 MCG/ACTUATION INHALER] INHALE 2 PUFFS 4 TIMES A DAY AS NEEDED FOR COUGH INHALED  0     traMADol (ULTRAM) 50 mg tablet [TRAMADOL (ULTRAM) 50 MG TABLET] Take 50 mg by mouth 4 (four) times a day as needed.  0     levothyroxine (SYNTHROID, LEVOTHROID) 75 MCG tablet [LEVOTHYROXINE (SYNTHROID, LEVOTHROID) 75 MCG TABLET] Take one tablet 5 days a week and two tablets 2 days a week 120 tablet 3     No facility-administered medications prior to visit.         Video-Visit Details    Type of service:  Video Visit    Video End Time: 1020    Originating Location (pt. Location): Other work    Distant Location (provider location):  [facility]     Platform used for Video Visit: Dawood    Date of last OV: 6/30/21  Reason for Visit: Hypothyroidism

## 2021-11-03 ENCOUNTER — IMMUNIZATION (OUTPATIENT)
Dept: NURSING | Facility: CLINIC | Age: 66
End: 2021-11-03
Payer: COMMERCIAL

## 2021-11-03 LAB — DEPRECATED CALCIDIOL+CALCIFEROL SERPL-MC: 34 UG/L (ref 30–80)

## 2021-11-03 PROCEDURE — 0004A PR COVID VAC PFIZER DIL RECON 30 MCG/0.3 ML IM: CPT

## 2021-11-03 PROCEDURE — 91300 PR COVID VAC PFIZER DIL RECON 30 MCG/0.3 ML IM: CPT

## 2021-11-09 ENCOUNTER — VIRTUAL VISIT (OUTPATIENT)
Dept: ENDOCRINOLOGY | Facility: CLINIC | Age: 66
End: 2021-11-09
Payer: COMMERCIAL

## 2021-11-09 DIAGNOSIS — E55.9 VITAMIN D DEFICIENCY: ICD-10-CM

## 2021-11-09 DIAGNOSIS — E89.0 POSTABLATIVE HYPOTHYROIDISM: Primary | ICD-10-CM

## 2021-11-09 PROBLEM — D12.2 BENIGN NEOPLASM OF ASCENDING COLON: Status: ACTIVE | Noted: 2020-11-25

## 2021-11-09 PROCEDURE — 99214 OFFICE O/P EST MOD 30 MIN: CPT | Mod: GT | Performed by: NURSE PRACTITIONER

## 2021-11-09 RX ORDER — LEVOTHYROXINE SODIUM 75 UG/1
TABLET ORAL
Qty: 360 TABLET | Refills: 3 | Status: SHIPPED | OUTPATIENT
Start: 2021-11-09 | End: 2023-01-05

## 2021-11-18 ENCOUNTER — LAB REQUISITION (OUTPATIENT)
Dept: LAB | Facility: CLINIC | Age: 66
End: 2021-11-18

## 2021-11-18 DIAGNOSIS — Z12.5 ENCOUNTER FOR SCREENING FOR MALIGNANT NEOPLASM OF PROSTATE: ICD-10-CM

## 2021-11-18 DIAGNOSIS — I10 ESSENTIAL (PRIMARY) HYPERTENSION: ICD-10-CM

## 2021-11-18 LAB
ANION GAP SERPL CALCULATED.3IONS-SCNC: 11 MMOL/L (ref 5–18)
BUN SERPL-MCNC: 14 MG/DL (ref 8–22)
CALCIUM SERPL-MCNC: 9.9 MG/DL (ref 8.5–10.5)
CHLORIDE BLD-SCNC: 103 MMOL/L (ref 98–107)
CO2 SERPL-SCNC: 26 MMOL/L (ref 22–31)
CREAT SERPL-MCNC: 0.96 MG/DL (ref 0.7–1.3)
GFR SERPL CREATININE-BSD FRML MDRD: 83 ML/MIN/1.73M2
GLUCOSE BLD-MCNC: 96 MG/DL (ref 70–125)
POTASSIUM BLD-SCNC: 3.7 MMOL/L (ref 3.5–5)
PSA SERPL-MCNC: 0.5 UG/L (ref 0–4.5)
SODIUM SERPL-SCNC: 140 MMOL/L (ref 136–145)

## 2021-11-18 PROCEDURE — G0103 PSA SCREENING: HCPCS | Performed by: FAMILY MEDICINE

## 2021-11-18 PROCEDURE — 80048 BASIC METABOLIC PNL TOTAL CA: CPT | Performed by: FAMILY MEDICINE

## 2021-12-27 ENCOUNTER — LAB (OUTPATIENT)
Dept: LAB | Facility: CLINIC | Age: 66
End: 2021-12-27
Payer: COMMERCIAL

## 2021-12-27 DIAGNOSIS — E89.0 POSTABLATIVE HYPOTHYROIDISM: ICD-10-CM

## 2021-12-27 DIAGNOSIS — E55.9 VITAMIN D DEFICIENCY: ICD-10-CM

## 2021-12-27 LAB
T4 FREE SERPL-MCNC: 1.2 NG/DL (ref 0.7–1.8)
TSH SERPL DL<=0.005 MIU/L-ACNC: 1.2 UIU/ML (ref 0.3–5)

## 2021-12-27 PROCEDURE — 84443 ASSAY THYROID STIM HORMONE: CPT

## 2021-12-27 PROCEDURE — 82306 VITAMIN D 25 HYDROXY: CPT

## 2021-12-27 PROCEDURE — 84439 ASSAY OF FREE THYROXINE: CPT

## 2021-12-27 PROCEDURE — 36415 COLL VENOUS BLD VENIPUNCTURE: CPT

## 2021-12-28 LAB — DEPRECATED CALCIDIOL+CALCIFEROL SERPL-MC: 32 UG/L (ref 30–80)

## 2022-06-21 DIAGNOSIS — E89.0 POSTABLATIVE HYPOTHYROIDISM: Primary | ICD-10-CM

## 2022-06-21 NOTE — PROGRESS NOTES
Letitia is a 66 year old who is being evaluated via a billable video visit.      How would you like to obtain your AVS? MyChart  If the video visit is dropped, the invitation should be resent by: Text to cell phone: [phone]  Will anyone else be joining your video visit? No      Video Start Time: 0900    [facility] ENDOCRINOLOGY    Thyroid Note  6/29/2022    Fred Sanches, 1955, [id number]          Reason for visit      1. Postablative hypothyroidism    2. Vitamin D deficiency        HPI     Fred Sanches is a very pleasant 66 year old old male who presents for follow up.  SUMMARY:    Letitia is contacted today in f/u for postablative hypothyroidism. He notes that he is feeling well. Current TSH is 1.41 and fT4 is 1.01. Current Renal function is normal.  He is taking Levothyroxine 75 mcg 4 days a week and 150 mcg 3 days a week. He is having no problems referable to his neck at present.     Past Medical History     Patient Active Problem List   Diagnosis     Hypertension     Allergic Rhinitis     Thyrotoxic exophthalmos     Graves' Disease     Postablative hypothyroidism     Hypothyroidism     Lipoprotein deficiency disorder     Obesity with body mass index 30 or greater     Obstructive sleep apnea     Thyrotoxicosis     Benign neoplasm of ascending colon       Family History       family history is not on file.    Social History      reports that he has never smoked. He has never used smokeless tobacco.      Review of Systems     Patient denies fatigue, weight changes, heat/cold intolerance, bowel/skin changes or CVS symptoms.   Remainder per HPI and per attached intake form.      Vital Signs     There were no vitals taken for this visit.  Wt Readings from Last 3 Encounters:   04/25/19 104.3 kg (230 lb)   03/07/18 106.7 kg (235 lb 3.2 oz)   03/10/17 102.1 kg (225 lb)       Physical Exam     Constitutional:  Well developed, Well nourished  HENT:  Normocephalic,   Neck: normal in  appearance  Eyes:  PERRL, Conjunctiva pink  Respiratory:  No respiratory distress  Skin: No acanthosis nigricans, lipoatrophy or lipodystrophy  Neurologic:  Alert & oriented x 3, nonfocal  Psychiatric:  Affect, Mood, Insight appropriate          Assessment     1. Postablative hypothyroidism    2. Vitamin D deficiency            Plan     Letitia is bio-chemically and physically euthyroid. He will remain on his current dose of Levothyroxine and will f/u with me in 6 months.         Belén Jacobo NP  [facility]  6/29/2022  9:26 AM          This note has been dictated using voice recognition software.  Any grammatical or context distortions are unintentional and inherent to the software.     Lab Results     TSH   Date Value Ref Range Status   06/28/2022 1.41 0.30 - 5.00 uIU/mL Final     No components found for: THYROIDAB    No results found for: [id number]    Imaging Results   Last thyroid ultrasound:  No results found for this or any previous visit.      Last thyroid nuclear scan:  Results for orders placed in visit on 06/19/13    NM Thyroid Uptake and Scan    Narrative  See Historical Hospital Medical Record for documentation      Current Medications     Outpatient Medications Prior to Visit   Medication Sig Dispense Refill     fluticasone (FLONASE) 50 mcg/actuation nasal spray [FLUTICASONE (FLONASE) 50 MCG/ACTUATION NASAL SPRAY] 2 sprays into each nostril as needed for rhinitis.       levothyroxine (SYNTHROID/LEVOTHROID) 75 MCG tablet [LEVOTHYROXINE (SYNTHROID, LEVOTHROID) 75 MCG TABLET] Take one tablet 4 days a week and two tablets 3 days a week 360 tablet 3     losartan-hydrochlorothiazide (HYZAAR) 50-12.5 mg per tablet [LOSARTAN-HYDROCHLOROTHIAZIDE (HYZAAR) 50-12.5 MG PER TABLET] Take 1 tablet by mouth daily.       methylPREDNISolone (MEDROL DOSEPACK) 4 mg tablet [METHYLPREDNISOLONE (MEDROL DOSEPACK) 4 MG TABLET] TAKE 6 TABLETS ON DAY 1 AS DIRECTED ON PACKAGE AND DECREASE BY 1 TAB EACH DAY FOR A TOTAL OF 6 DAYS   2     naproxen (NAPROSYN) 375 MG tablet [NAPROXEN (NAPROSYN) 375 MG TABLET] Take 375 mg by mouth 2 (two) times a day.  0     PROAIR HFA 90 mcg/actuation inhaler [PROAIR HFA 90 MCG/ACTUATION INHALER] INHALE 2 PUFFS 4 TIMES A DAY AS NEEDED FOR COUGH INHALED  0     traMADol (ULTRAM) 50 mg tablet [TRAMADOL (ULTRAM) 50 MG TABLET] Take 50 mg by mouth 4 (four) times a day as needed.  0     levothyroxine (SYNTHROID, LEVOTHROID) 75 MCG tablet [LEVOTHYROXINE (SYNTHROID, LEVOTHROID) 75 MCG TABLET] TAKE 1 TABLET EVERY DAY 6 DAYS A WEEK AND TAKE 2 TABLETS ON THE 7TH  tablet 0     No facility-administered medications prior to visit.           Video-Visit Details      Type of service:  Video Visit    Video End Time: 0920    Originating Location (pt. Location): Home    Distant Location (provider location):  [facility]     Platform used for Video Visit: Dawood    Date of last OV: 11/09/21  Reason for Visit: Hypothyroidism

## 2022-06-28 ENCOUNTER — IMMUNIZATION (OUTPATIENT)
Dept: NURSING | Facility: CLINIC | Age: 67
End: 2022-06-28

## 2022-06-28 ENCOUNTER — LAB (OUTPATIENT)
Dept: LAB | Facility: CLINIC | Age: 67
End: 2022-06-28
Payer: COMMERCIAL

## 2022-06-28 ENCOUNTER — TELEPHONE (OUTPATIENT)
Dept: ENDOCRINOLOGY | Facility: CLINIC | Age: 67
End: 2022-06-28

## 2022-06-28 DIAGNOSIS — E89.0 POSTABLATIVE HYPOTHYROIDISM: ICD-10-CM

## 2022-06-28 DIAGNOSIS — E89.0 POSTABLATIVE HYPOTHYROIDISM: Primary | ICD-10-CM

## 2022-06-28 LAB
ANION GAP SERPL CALCULATED.3IONS-SCNC: 11 MMOL/L (ref 5–18)
BUN SERPL-MCNC: 15 MG/DL (ref 8–22)
CALCIUM SERPL-MCNC: 9.4 MG/DL (ref 8.5–10.5)
CHLORIDE BLD-SCNC: 106 MMOL/L (ref 98–107)
CHOLEST SERPL-MCNC: 188 MG/DL
CO2 SERPL-SCNC: 22 MMOL/L (ref 22–31)
CREAT SERPL-MCNC: 0.9 MG/DL (ref 0.7–1.3)
GFR SERPL CREATININE-BSD FRML MDRD: >90 ML/MIN/1.73M2
GLUCOSE BLD-MCNC: 118 MG/DL (ref 70–125)
HDLC SERPL-MCNC: 35 MG/DL
LDLC SERPL CALC-MCNC: 124 MG/DL
POTASSIUM BLD-SCNC: 4 MMOL/L (ref 3.5–5)
SODIUM SERPL-SCNC: 139 MMOL/L (ref 136–145)
T4 FREE SERPL-MCNC: 1.01 NG/DL (ref 0.7–1.8)
TRIGL SERPL-MCNC: 144 MG/DL
TSH SERPL DL<=0.005 MIU/L-ACNC: 1.41 UIU/ML (ref 0.3–5)

## 2022-06-28 PROCEDURE — 80061 LIPID PANEL: CPT

## 2022-06-28 PROCEDURE — 84443 ASSAY THYROID STIM HORMONE: CPT

## 2022-06-28 PROCEDURE — 0054A COVID-19,PF,PFIZER (12+ YRS): CPT

## 2022-06-28 PROCEDURE — 80048 BASIC METABOLIC PNL TOTAL CA: CPT

## 2022-06-28 PROCEDURE — 36415 COLL VENOUS BLD VENIPUNCTURE: CPT

## 2022-06-28 PROCEDURE — 84439 ASSAY OF FREE THYROXINE: CPT

## 2022-06-28 PROCEDURE — 91305 COVID-19,PF,PFIZER (12+ YRS): CPT

## 2022-06-28 NOTE — TELEPHONE ENCOUNTER
[facility]    Phone Message    May a detailed message be left on voicemail: yes     Reason for Call: Order(s): Other:   Reason for requested: diabetes and cholesterol labs    Date needed: ASAP    Provider name: Odalis    Pt stated he just completed labs, but is hoping orders for DM labs and cholesterol can be sent so he doesn't have to go in again next week.  Please place orders if possible and let pt know.       Action Taken: Other: endo    Travel Screening: Not Applicable

## 2022-06-29 ENCOUNTER — VIRTUAL VISIT (OUTPATIENT)
Dept: ENDOCRINOLOGY | Facility: CLINIC | Age: 67
End: 2022-06-29
Payer: COMMERCIAL

## 2022-06-29 DIAGNOSIS — E89.0 POSTABLATIVE HYPOTHYROIDISM: Primary | ICD-10-CM

## 2022-06-29 DIAGNOSIS — E55.9 VITAMIN D DEFICIENCY: ICD-10-CM

## 2022-06-29 PROCEDURE — 99213 OFFICE O/P EST LOW 20 MIN: CPT | Mod: 95 | Performed by: NURSE PRACTITIONER

## 2022-11-22 ENCOUNTER — LAB REQUISITION (OUTPATIENT)
Dept: LAB | Facility: CLINIC | Age: 67
End: 2022-11-22
Payer: MEDICARE

## 2022-11-22 DIAGNOSIS — I10 ESSENTIAL (PRIMARY) HYPERTENSION: ICD-10-CM

## 2022-11-22 DIAGNOSIS — E03.9 HYPOTHYROIDISM, UNSPECIFIED: ICD-10-CM

## 2022-11-22 DIAGNOSIS — E78.6 LIPOPROTEIN DEFICIENCY: ICD-10-CM

## 2022-11-22 DIAGNOSIS — Z12.5 ENCOUNTER FOR SCREENING FOR MALIGNANT NEOPLASM OF PROSTATE: ICD-10-CM

## 2022-11-22 LAB
ANION GAP SERPL CALCULATED.3IONS-SCNC: 15 MMOL/L (ref 7–15)
BUN SERPL-MCNC: 14.9 MG/DL (ref 8–23)
CALCIUM SERPL-MCNC: 9.2 MG/DL (ref 8.8–10.2)
CHLORIDE SERPL-SCNC: 98 MMOL/L (ref 98–107)
CHOLEST SERPL-MCNC: 183 MG/DL
CREAT SERPL-MCNC: 0.95 MG/DL (ref 0.67–1.17)
DEPRECATED HCO3 PLAS-SCNC: 24 MMOL/L (ref 22–29)
GFR SERPL CREATININE-BSD FRML MDRD: 89 ML/MIN/1.73M2
GLUCOSE SERPL-MCNC: 126 MG/DL (ref 70–99)
HDLC SERPL-MCNC: 34 MG/DL
LDLC SERPL CALC-MCNC: 132 MG/DL
NONHDLC SERPL-MCNC: 149 MG/DL
POTASSIUM SERPL-SCNC: 3.6 MMOL/L (ref 3.4–5.3)
PSA SERPL-MCNC: 1.14 NG/ML (ref 0–4.5)
SODIUM SERPL-SCNC: 137 MMOL/L (ref 136–145)
TRIGL SERPL-MCNC: 85 MG/DL
TSH SERPL DL<=0.005 MIU/L-ACNC: 1.98 UIU/ML (ref 0.3–4.2)

## 2022-11-22 PROCEDURE — 80061 LIPID PANEL: CPT | Mod: ORL | Performed by: FAMILY MEDICINE

## 2022-11-22 PROCEDURE — G0103 PSA SCREENING: HCPCS | Mod: ORL | Performed by: FAMILY MEDICINE

## 2022-11-22 PROCEDURE — 84443 ASSAY THYROID STIM HORMONE: CPT | Mod: ORL | Performed by: FAMILY MEDICINE

## 2022-11-22 PROCEDURE — 80048 BASIC METABOLIC PNL TOTAL CA: CPT | Mod: ORL | Performed by: FAMILY MEDICINE

## 2023-01-05 ENCOUNTER — OFFICE VISIT (OUTPATIENT)
Dept: ENDOCRINOLOGY | Facility: CLINIC | Age: 68
End: 2023-01-05
Payer: MEDICARE

## 2023-01-05 VITALS — DIASTOLIC BLOOD PRESSURE: 76 MMHG | WEIGHT: 244 LBS | SYSTOLIC BLOOD PRESSURE: 110 MMHG | BODY MASS INDEX: 33.09 KG/M2

## 2023-01-05 DIAGNOSIS — E89.0 POSTABLATIVE HYPOTHYROIDISM: ICD-10-CM

## 2023-01-05 DIAGNOSIS — E55.9 VITAMIN D DEFICIENCY: ICD-10-CM

## 2023-01-05 PROBLEM — N18.2 CHRONIC KIDNEY DISEASE, STAGE 2 (MILD): Status: ACTIVE | Noted: 2023-01-05

## 2023-01-05 PROBLEM — Z86.0100 HISTORY OF COLON POLYPS: Status: ACTIVE | Noted: 2023-01-05

## 2023-01-05 LAB
CREAT SERPL-MCNC: 0.96 MG/DL (ref 0.67–1.17)
DEPRECATED CALCIDIOL+CALCIFEROL SERPL-MC: 25 UG/L (ref 20–75)
GFR SERPL CREATININE-BSD FRML MDRD: 88 ML/MIN/1.73M2
T4 FREE SERPL-MCNC: 1.31 NG/DL (ref 0.9–1.7)
TSH SERPL DL<=0.005 MIU/L-ACNC: 2.52 UIU/ML (ref 0.3–4.2)

## 2023-01-05 PROCEDURE — 84439 ASSAY OF FREE THYROXINE: CPT | Performed by: NURSE PRACTITIONER

## 2023-01-05 PROCEDURE — 36415 COLL VENOUS BLD VENIPUNCTURE: CPT | Performed by: NURSE PRACTITIONER

## 2023-01-05 PROCEDURE — 99213 OFFICE O/P EST LOW 20 MIN: CPT | Performed by: NURSE PRACTITIONER

## 2023-01-05 PROCEDURE — 82565 ASSAY OF CREATININE: CPT | Performed by: NURSE PRACTITIONER

## 2023-01-05 PROCEDURE — 84443 ASSAY THYROID STIM HORMONE: CPT | Performed by: NURSE PRACTITIONER

## 2023-01-05 PROCEDURE — 82306 VITAMIN D 25 HYDROXY: CPT | Performed by: NURSE PRACTITIONER

## 2023-01-05 RX ORDER — LEVOTHYROXINE SODIUM 75 UG/1
TABLET ORAL
Qty: 360 TABLET | Refills: 3 | Status: SHIPPED | OUTPATIENT
Start: 2023-01-05 | End: 2023-02-27

## 2023-01-05 NOTE — PROGRESS NOTES
[facility] ENDOCRINOLOGY    Thyroid Note  1/5/2023    Fred Sanches, 1955, [id number]          Reason for visit      1. Postablative hypothyroidism    2. Vitamin D deficiency        HPI     Fred Sanches is a very pleasant 67 year old old male who presents for follow up.  SUMMARY:    Letitia is here today in f/u for Postablative Hypothyroidism. He is freshly back (0200) from [place] today.  He was unable to keep his lab appointment because he was there, but they were obtained prior to this note being closed. Letitia is reporting that he has unexpectedly gained 8-10 lbs.  He states that he is also feeling sluggish.  He correlates these two things with about the time that his hydrochlorothiazide dose was increased. I suspect that the change in his body Volume affected his the absorption of his medication. His TSH is elevated for him, at 2.52 and it was 1.41 at his last visit. His fT4 is 1.31. Current dose is 150 mcg MWF and 75 mcg TTSS.          Past Medical History     Patient Active Problem List   Diagnosis     Hypertension     Allergic Rhinitis     Thyrotoxic exophthalmos     Graves' Disease     Postablative hypothyroidism     Hypothyroidism     Lipoprotein deficiency disorder     Obesity with body mass index 30 or greater     Obstructive sleep apnea     Thyrotoxicosis     Benign neoplasm of ascending colon     Chronic kidney disease, stage 2 (mild)     History of colon polyps       Family History       family history is not on file.    Social History      reports that he has never smoked. He has never used smokeless tobacco.      Review of Systems     Patient denies fatigue, weight changes, heat/cold intolerance, bowel/skin changes or CVS symptoms.   Remainder per HPI and per attached intake form.      Vital Signs     /76 (BP Location: Right arm, Patient Position: Sitting, Cuff Size: Adult Large)   Wt 110.7 kg (244 lb)   BMI 33.09 kg/m    Wt Readings from Last 3 Encounters:    01/05/23 110.7 kg (244 lb)   04/25/19 104.3 kg (230 lb)   03/07/18 106.7 kg (235 lb 3.2 oz)       Physical Exam     General:  Normal, NIRD,appears euthyroid  Eyes:  Pupils equal, round and reactive to light; no proptosis, lid lag or  periorbital edema.  Thyroid:  Thyroid is normal.  No tenderness or bruit  Neck: No lymph nodes  Musculoskeletal:  Muscle strength grossly normal without evidence of wasting.  Heart:  Regular rate and rhythm without murmur.  Lungs:  Clear to auscultation.  Abdomen: Soft, non-tender, no masses or organomegaly  Neuro: Patella Reflexes were normal.No tremors  Skin:  No acanthosis nigricans or vitiligo      Assessment     1. Postablative hypothyroidism    2. Vitamin D deficiency            Plan     Letitia is going to stop taking the extra hydrochlorothiazide (12.5 mg) and see how things go before he makes a change in his Levothyroxine dose. He will get labs done again in 6-8 weeks.  Will f/u together in 6 months.        Belén Jacobo NP  [facility]  1/5/2023  2:42 PM      Lab Results     TSH   Date Value Ref Range Status   01/05/2023 2.52 0.30 - 4.20 uIU/mL Final   06/28/2022 1.41 0.30 - 5.00 uIU/mL Final     No components found for: THYROIDAB    No results found for: [id number]    Imaging Results   Last thyroid ultrasound:  No results found for this or any previous visit.      Last thyroid nuclear scan:  Results for orders placed in visit on 06/19/13    NM Thyroid Uptake and Scan    Narrative  See Historical Hospital Medical Record for documentation      Current Medications     Outpatient Medications Prior to Visit   Medication Sig Dispense Refill     fluticasone (FLONASE) 50 mcg/actuation nasal spray [FLUTICASONE (FLONASE) 50 MCG/ACTUATION NASAL SPRAY] 2 sprays into each nostril as needed for rhinitis.       levothyroxine (SYNTHROID/LEVOTHROID) 75 MCG tablet [LEVOTHYROXINE (SYNTHROID, LEVOTHROID) 75 MCG TABLET] Take one tablet 4 days a week and two tablets 3 days a week 360 tablet 3      losartan-hydrochlorothiazide (HYZAAR) 50-12.5 mg per tablet [LOSARTAN-HYDROCHLOROTHIAZIDE (HYZAAR) 50-12.5 MG PER TABLET] Take 1 tablet by mouth daily.       methylPREDNISolone (MEDROL DOSEPACK) 4 mg tablet [METHYLPREDNISOLONE (MEDROL DOSEPACK) 4 MG TABLET] TAKE 6 TABLETS ON DAY 1 AS DIRECTED ON PACKAGE AND DECREASE BY 1 TAB EACH DAY FOR A TOTAL OF 6 DAYS  2     naproxen (NAPROSYN) 375 MG tablet [NAPROXEN (NAPROSYN) 375 MG TABLET] Take 375 mg by mouth 2 (two) times a day.  0     PROAIR HFA 90 mcg/actuation inhaler [PROAIR HFA 90 MCG/ACTUATION INHALER] INHALE 2 PUFFS 4 TIMES A DAY AS NEEDED FOR COUGH INHALED  0     traMADol (ULTRAM) 50 mg tablet [TRAMADOL (ULTRAM) 50 MG TABLET] Take 50 mg by mouth 4 (four) times a day as needed.  0     No facility-administered medications prior to visit.

## 2023-01-05 NOTE — LETTER
1/5/2023         RE: Frde Sanches  [address] [address]        Dear Colleague,    Thank you for referring your patient, Fred Sanches, to the [facility]. Please see a copy of my visit note below.    [facility] ENDOCRINOLOGY    Thyroid Note  1/5/2023    Fred Sanches, 1955, [id number]          Reason for visit      1. Postablative hypothyroidism    2. Vitamin D deficiency        HPI     Fred Sanches is a very pleasant 67 year old old male who presents for follow up.  SUMMARY:    Letitia is here today in f/u for Postablative Hypothyroidism. He is freshly back (0200) from [place] today.  He was unable to keep his lab appointment because he was there, but they were obtained prior to this note being closed. Letitia is reporting that he has unexpectedly gained 8-10 lbs.  He states that he is also feeling sluggish.  He correlates these two things with about the time that his hydrochlorothiazide dose was increased. I suspect that the change in his body Volume affected his the absorption of his medication. His TSH is elevated for him, at 2.52 and it was 1.41 at his last visit. His fT4 is 1.31. Current dose is 150 mcg MWF and 75 mcg TTSS.          Past Medical History     Patient Active Problem List   Diagnosis     Hypertension     Allergic Rhinitis     Thyrotoxic exophthalmos     Graves' Disease     Postablative hypothyroidism     Hypothyroidism     Lipoprotein deficiency disorder     Obesity with body mass index 30 or greater     Obstructive sleep apnea     Thyrotoxicosis     Benign neoplasm of ascending colon     Chronic kidney disease, stage 2 (mild)     History of colon polyps       Family History       family history is not on file.    Social History      reports that he has never smoked. He has never used smokeless tobacco.      Review of Systems     Patient denies fatigue, weight changes, heat/cold intolerance,  bowel/skin changes or CVS symptoms.   Remainder per HPI and per attached intake form.      Vital Signs     /76 (BP Location: Right arm, Patient Position: Sitting, Cuff Size: Adult Large)   Wt 110.7 kg (244 lb)   BMI 33.09 kg/m    Wt Readings from Last 3 Encounters:   01/05/23 110.7 kg (244 lb)   04/25/19 104.3 kg (230 lb)   03/07/18 106.7 kg (235 lb 3.2 oz)       Physical Exam     General:  Normal, NIRD,appears euthyroid  Eyes:  Pupils equal, round and reactive to light; no proptosis, lid lag or  periorbital edema.  Thyroid:  Thyroid is normal.  No tenderness or bruit  Neck: No lymph nodes  Musculoskeletal:  Muscle strength grossly normal without evidence of wasting.  Heart:  Regular rate and rhythm without murmur.  Lungs:  Clear to auscultation.  Abdomen: Soft, non-tender, no masses or organomegaly  Neuro: Patella Reflexes were normal.No tremors  Skin:  No acanthosis nigricans or vitiligo      Assessment     1. Postablative hypothyroidism    2. Vitamin D deficiency            Plan     I will suggest (upon re-connecting with patient) that he take 150 mcg TTSS and 75 mcg MWF.  Will f/u together in 6 months.        Belén Jacobo NP  [facility]  1/5/2023  2:42 PM      Lab Results     TSH   Date Value Ref Range Status   01/05/2023 2.52 0.30 - 4.20 uIU/mL Final   06/28/2022 1.41 0.30 - 5.00 uIU/mL Final     No components found for: THYROIDAB    No results found for: N3OQCMZ    Imaging Results   Last thyroid ultrasound:  No results found for this or any previous visit.      Last thyroid nuclear scan:  Results for orders placed in visit on 06/19/13    NM Thyroid Uptake and Scan    Narrative  See Historical Hospital Medical Record for documentation      Current Medications     Outpatient Medications Prior to Visit   Medication Sig Dispense Refill     fluticasone (FLONASE) 50 mcg/actuation nasal spray [FLUTICASONE (FLONASE) 50 MCG/ACTUATION NASAL SPRAY] 2 sprays into each nostril as needed for rhinitis.        levothyroxine (SYNTHROID/LEVOTHROID) 75 MCG tablet [LEVOTHYROXINE (SYNTHROID, LEVOTHROID) 75 MCG TABLET] Take one tablet 4 days a week and two tablets 3 days a week 360 tablet 3     losartan-hydrochlorothiazide (HYZAAR) 50-12.5 mg per tablet [LOSARTAN-HYDROCHLOROTHIAZIDE (HYZAAR) 50-12.5 MG PER TABLET] Take 1 tablet by mouth daily.       methylPREDNISolone (MEDROL DOSEPACK) 4 mg tablet [METHYLPREDNISOLONE (MEDROL DOSEPACK) 4 MG TABLET] TAKE 6 TABLETS ON DAY 1 AS DIRECTED ON PACKAGE AND DECREASE BY 1 TAB EACH DAY FOR A TOTAL OF 6 DAYS  2     naproxen (NAPROSYN) 375 MG tablet [NAPROXEN (NAPROSYN) 375 MG TABLET] Take 375 mg by mouth 2 (two) times a day.  0     PROAIR HFA 90 mcg/actuation inhaler [PROAIR HFA 90 MCG/ACTUATION INHALER] INHALE 2 PUFFS 4 TIMES A DAY AS NEEDED FOR COUGH INHALED  0     traMADol (ULTRAM) 50 mg tablet [TRAMADOL (ULTRAM) 50 MG TABLET] Take 50 mg by mouth 4 (four) times a day as needed.  0     No facility-administered medications prior to visit.             Again, thank you for allowing me to participate in the care of your patient.        Sincerely,        Belén Jacobo NP

## 2023-02-24 ENCOUNTER — TELEPHONE (OUTPATIENT)
Dept: ENDOCRINOLOGY | Facility: CLINIC | Age: 68
End: 2023-02-24

## 2023-02-24 ENCOUNTER — LAB (OUTPATIENT)
Dept: LAB | Facility: CLINIC | Age: 68
End: 2023-02-24
Payer: MEDICARE

## 2023-02-24 DIAGNOSIS — E89.0 POSTABLATIVE HYPOTHYROIDISM: ICD-10-CM

## 2023-02-24 LAB
T4 FREE SERPL-MCNC: 1.21 NG/DL (ref 0.9–1.7)
TSH SERPL DL<=0.005 MIU/L-ACNC: 1.82 UIU/ML (ref 0.3–4.2)

## 2023-02-24 PROCEDURE — 84439 ASSAY OF FREE THYROXINE: CPT

## 2023-02-24 PROCEDURE — 36415 COLL VENOUS BLD VENIPUNCTURE: CPT

## 2023-02-24 PROCEDURE — 84443 ASSAY THYROID STIM HORMONE: CPT

## 2023-02-24 NOTE — TELEPHONE ENCOUNTER
Per duplicate encounter:  Reason for Call: Other: Per pt would like a call back about his results. Writer asked how he was feeling.  Per pt isnt feeling 100 %

## 2023-02-24 NOTE — TELEPHONE ENCOUNTER
"Spoke to patient- pt is \"lethargic\", tired, sleeping is a little tougher right now, pt states weight gain is still there as well.   "

## 2023-02-24 NOTE — TELEPHONE ENCOUNTER
----- Message from Belén Jacobo NP sent at 2/24/2023  2:26 PM CST -----  Team - please call Letitia and see how he is feeling.

## 2023-02-27 RX ORDER — LEVOTHYROXINE SODIUM 75 UG/1
TABLET ORAL
Qty: 108 TABLET | Refills: 0 | Status: SHIPPED | OUTPATIENT
Start: 2023-02-27 | End: 2023-08-02

## 2023-02-27 NOTE — TELEPHONE ENCOUNTER
Per provider:  instruct him to take 150 mcg 5 days a week and 75 mcg 2 days a week.    Pt informed.

## 2023-07-11 ENCOUNTER — LAB (OUTPATIENT)
Dept: LAB | Facility: CLINIC | Age: 68
End: 2023-07-11
Payer: MEDICARE

## 2023-07-11 DIAGNOSIS — E89.0 POSTABLATIVE HYPOTHYROIDISM: ICD-10-CM

## 2023-07-11 LAB
T4 FREE SERPL-MCNC: 1.42 NG/DL (ref 0.9–1.7)
TSH SERPL DL<=0.005 MIU/L-ACNC: 0.58 UIU/ML (ref 0.3–4.2)

## 2023-07-11 PROCEDURE — 84443 ASSAY THYROID STIM HORMONE: CPT

## 2023-07-11 PROCEDURE — 36415 COLL VENOUS BLD VENIPUNCTURE: CPT

## 2023-07-11 PROCEDURE — 84439 ASSAY OF FREE THYROXINE: CPT

## 2023-07-20 ENCOUNTER — OFFICE VISIT (OUTPATIENT)
Dept: ENDOCRINOLOGY | Facility: CLINIC | Age: 68
End: 2023-07-20
Payer: MEDICARE

## 2023-07-20 VITALS
HEART RATE: 80 BPM | WEIGHT: 238.7 LBS | DIASTOLIC BLOOD PRESSURE: 82 MMHG | SYSTOLIC BLOOD PRESSURE: 128 MMHG | BODY MASS INDEX: 32.37 KG/M2 | OXYGEN SATURATION: 95 %

## 2023-07-20 DIAGNOSIS — Z83.3 FAMILY HISTORY OF DIABETES MELLITUS: ICD-10-CM

## 2023-07-20 DIAGNOSIS — E89.0 POSTABLATIVE HYPOTHYROIDISM: Primary | ICD-10-CM

## 2023-07-20 PROBLEM — E05.90 THYROTOXICOSIS: Status: RESOLVED | Noted: 2021-06-30 | Resolved: 2023-07-20

## 2023-07-20 PROBLEM — E03.9 HYPOTHYROIDISM: Status: RESOLVED | Noted: 2021-06-30 | Resolved: 2023-07-20

## 2023-07-20 PROCEDURE — 99214 OFFICE O/P EST MOD 30 MIN: CPT | Performed by: NURSE PRACTITIONER

## 2023-07-20 NOTE — LETTER
7/20/2023         RE: Fred Sanches  [address] [address]        Dear Colleague,    Thank you for referring your patient, Fred Sanches, to the [facility]. Please see a copy of my visit note below.    [facility] ENDOCRINOLOGY    Thyroid Note  7/20/2023    Fred Sanches, 1955, [id number]          Reason for visit      1. Postablative hypothyroidism        HPI     Fred Sanches is a very pleasant 67 year old old male who presents for follow up.  SUMMARY:    Letitia returns today in follow-up for postablative Hypothyroidism. He reports that he is feeling well.  Current TSH is 0.58 and fT4 is 1.42.  He is taking 150 mcg 5 days a week and 75 mcg 2 days a week.  He has been able to lose a little weight, but continues to work on it. He is having no problems referable to his neck.     Past Medical History     Patient Active Problem List   Diagnosis     Hypertension     Allergic Rhinitis     Thyrotoxic exophthalmos     Graves' Disease     Postablative hypothyroidism     Lipoprotein deficiency disorder     Obesity with body mass index 30 or greater     Obstructive sleep apnea     Benign neoplasm of ascending colon     Chronic kidney disease, stage 2 (mild)     History of colon polyps       Family History       family history is not on file.    Social History      reports that he has never smoked. He has never used smokeless tobacco.      Review of Systems     Patient denies fatigue, weight changes, heat/cold intolerance, bowel/skin changes or CVS symptoms.   Remainder per HPI and per attached intake form.      Vital Signs     /82 (BP Location: Right arm, Patient Position: Sitting, Cuff Size: Adult Large)   Pulse 80   Wt 108.3 kg (238 lb 11.2 oz)   SpO2 95%   BMI 32.37 kg/m    Wt Readings from Last 3 Encounters:   07/20/23 108.3 kg (238 lb 11.2 oz)   01/05/23 110.7 kg (244 lb)   04/25/19 104.3 kg (230 lb)       Physical  Exam     General:  Normal, NIRD,appears euthyroid  Eyes:  Pupils equal, round and reactive to light; no proptosis,mild lid lag, no periorbital edema.  Thyroid:  Thyroid is normal to palpation  Neck: No lymph nodes  Musculoskeletal:  Muscle strength grossly normal without evidence of wasting.  Heart:  Regular rate and rhythm without murmur.  Lungs:  Clear to auscultation.  Abdomen: Soft, non-tender, no masses or organomegaly  Neuro: Patella Reflexes were normal.No tremors  Skin:  No acanthosis nigricans or vitiligo          Assessment     1. Postablative hypothyroidism            Plan     We did discuss the use of GLP1 for weight loss and possible ramifications when he stops using it. He is not interested any longer.      Bio-chemically and physically euthyroid. He will remain on his current medication regimen and will f/u with me in 6 months.         Belén Jacobo NP  [facility]  7/20/2023  9:16 AM      Lab Results     TSH   Date Value Ref Range Status   07/11/2023 0.58 0.30 - 4.20 uIU/mL Final   06/28/2022 1.41 0.30 - 5.00 uIU/mL Final     No components found for: THYROIDAB    No results found for: [id number]    Imaging Results   Last thyroid ultrasound:  No results found for this or any previous visit.      Last thyroid nuclear scan:  Results for orders placed in visit on 06/19/13    NM Thyroid Uptake and Scan    Narrative  See Historical Hospital Medical Record for documentation      Current Medications     Outpatient Medications Prior to Visit   Medication Sig Dispense Refill     fluticasone (FLONASE) 50 mcg/actuation nasal spray [FLUTICASONE (FLONASE) 50 MCG/ACTUATION NASAL SPRAY] 2 sprays into each nostril as needed for rhinitis.       levothyroxine (SYNTHROID/LEVOTHROID) 75 MCG tablet Take one tablet 2 days a week and two tablets 5 days a week 108 tablet 0     losartan-hydrochlorothiazide (HYZAAR) 50-12.5 mg per tablet [LOSARTAN-HYDROCHLOROTHIAZIDE (HYZAAR) 50-12.5 MG PER TABLET] Take 1 tablet by mouth  daily.       No facility-administered medications prior to visit.             Again, thank you for allowing me to participate in the care of your patient.        Sincerely,        Belén Jacobo NP

## 2023-07-20 NOTE — PROGRESS NOTES
[facility] ENDOCRINOLOGY    Thyroid Note  7/20/2023    Fred Sanches, 1955, [id number]          Reason for visit      1. Postablative hypothyroidism        HPI     Fred Sanches is a very pleasant 67 year old old male who presents for follow up.  SUMMARY:    Letitia returns today in follow-up for postablative Hypothyroidism. He reports that he is feeling well.  Current TSH is 0.58 and fT4 is 1.42.  He is taking 150 mcg 5 days a week and 75 mcg 2 days a week.  He has been able to lose a little weight, but continues to work on it. He is having no problems referable to his neck.     Past Medical History     Patient Active Problem List   Diagnosis     Hypertension     Allergic Rhinitis     Thyrotoxic exophthalmos     Graves' Disease     Postablative hypothyroidism     Lipoprotein deficiency disorder     Obesity with body mass index 30 or greater     Obstructive sleep apnea     Benign neoplasm of ascending colon     Chronic kidney disease, stage 2 (mild)     History of colon polyps       Family History       family history is not on file.    Social History      reports that he has never smoked. He has never used smokeless tobacco.      Review of Systems     Patient denies fatigue, weight changes, heat/cold intolerance, bowel/skin changes or CVS symptoms.   Remainder per HPI and per attached intake form.      Vital Signs     /82 (BP Location: Right arm, Patient Position: Sitting, Cuff Size: Adult Large)   Pulse 80   Wt 108.3 kg (238 lb 11.2 oz)   SpO2 95%   BMI 32.37 kg/m    Wt Readings from Last 3 Encounters:   07/20/23 108.3 kg (238 lb 11.2 oz)   01/05/23 110.7 kg (244 lb)   04/25/19 104.3 kg (230 lb)       Physical Exam     General:  Normal, NIRD,appears euthyroid  Eyes:  Pupils equal, round and reactive to light; no proptosis,mild lid lag, no periorbital edema.  Thyroid:  Thyroid is normal to palpation  Neck: No lymph nodes  Musculoskeletal:  Muscle strength grossly normal  without evidence of wasting.  Heart:  Regular rate and rhythm without murmur.  Lungs:  Clear to auscultation.  Abdomen: Soft, non-tender, no masses or organomegaly  Neuro: Patella Reflexes were normal.No tremors  Skin:  No acanthosis nigricans or vitiligo          Assessment     1. Postablative hypothyroidism            Plan     We did discuss the use of GLP1 for weight loss and possible ramifications when he stops using it. He is not interested any longer.      Bio-chemically and physically euthyroid. He will remain on his current medication regimen and will f/u with me in 6 months.         Belén Jacobo NP  [facility]  7/20/2023  9:16 AM      Lab Results     TSH   Date Value Ref Range Status   07/11/2023 0.58 0.30 - 4.20 uIU/mL Final   06/28/2022 1.41 0.30 - 5.00 uIU/mL Final     No components found for: THYROIDAB    No results found for: [id number]    Imaging Results   Last thyroid ultrasound:  No results found for this or any previous visit.      Last thyroid nuclear scan:  Results for orders placed in visit on 06/19/13    NM Thyroid Uptake and Scan    Narrative  See Historical Hospital Medical Record for documentation      Current Medications     Outpatient Medications Prior to Visit   Medication Sig Dispense Refill     fluticasone (FLONASE) 50 mcg/actuation nasal spray [FLUTICASONE (FLONASE) 50 MCG/ACTUATION NASAL SPRAY] 2 sprays into each nostril as needed for rhinitis.       levothyroxine (SYNTHROID/LEVOTHROID) 75 MCG tablet Take one tablet 2 days a week and two tablets 5 days a week 108 tablet 0     losartan-hydrochlorothiazide (HYZAAR) 50-12.5 mg per tablet [LOSARTAN-HYDROCHLOROTHIAZIDE (HYZAAR) 50-12.5 MG PER TABLET] Take 1 tablet by mouth daily.       No facility-administered medications prior to visit.

## 2023-08-02 DIAGNOSIS — E89.0 POSTABLATIVE HYPOTHYROIDISM: ICD-10-CM

## 2023-08-02 RX ORDER — LEVOTHYROXINE SODIUM 75 UG/1
TABLET ORAL
Qty: 144 TABLET | Refills: 0 | Status: SHIPPED | OUTPATIENT
Start: 2023-08-02 | End: 2023-10-23

## 2023-08-02 NOTE — TELEPHONE ENCOUNTER
"Requested Prescriptions   Pending Prescriptions Disp Refills    levothyroxine (SYNTHROID/LEVOTHROID) 75 MCG tablet [Pharmacy Med Name: LEVOTHYROXINE 75 MCG TABLET] 108 tablet 0     Sig: TAKE ONE TABLET 2 DAYS A WEEK AND TWO TABLETS 5 DAYS A WEEK       Thyroid Protocol Passed - 8/2/2023  9:43 AM        Passed - Patient is 12 years or older        Passed - Recent (12 mo) or future (30 days) visit within the authorizing provider's specialty     Patient has had an office visit with the authorizing provider or a provider within the authorizing providers department within the previous 12 mos or has a future within next 30 days. See \"Patient Info\" tab in inbasket, or \"Choose Columns\" in Meds & Orders section of the refill encounter.              Passed - Medication is active on med list        Passed - Normal TSH on file in past 12 months     Recent Labs   Lab Test 07/11/23  0752   TSH 0.58                   "

## 2023-10-22 DIAGNOSIS — E89.0 POSTABLATIVE HYPOTHYROIDISM: ICD-10-CM

## 2023-10-23 RX ORDER — LEVOTHYROXINE SODIUM 75 UG/1
TABLET ORAL
Qty: 144 TABLET | Refills: 0 | Status: SHIPPED | OUTPATIENT
Start: 2023-10-23 | End: 2024-01-01

## 2023-10-23 NOTE — TELEPHONE ENCOUNTER
"    Requested Prescriptions   Pending Prescriptions Disp Refills    levothyroxine (SYNTHROID/LEVOTHROID) 75 MCG tablet [Pharmacy Med Name: LEVOTHYROXINE 75 MCG TABLET] 144 tablet 0     Sig: TAKE ONE TABLET 2 DAYS A WEEK AND TWO TABLETS 5 DAYS A WEEK       Thyroid Protocol Passed - 10/22/2023  9:24 AM        Passed - Patient is 12 years or older        Passed - Recent (12 mo) or future (30 days) visit within the authorizing provider's specialty     Patient has had an office visit with the authorizing provider or a provider within the authorizing providers department within the previous 12 mos or has a future within next 30 days. See \"Patient Info\" tab in inbasket, or \"Choose Columns\" in Meds & Orders section of the refill encounter.              Passed - Medication is active on med list        Passed - Normal TSH on file in past 12 months     Recent Labs   Lab Test 07/11/23  0752   TSH 0.58                   "

## 2023-11-27 ENCOUNTER — LAB REQUISITION (OUTPATIENT)
Dept: LAB | Facility: CLINIC | Age: 68
End: 2023-11-27
Payer: MEDICARE

## 2023-11-27 ENCOUNTER — TRANSFERRED RECORDS (OUTPATIENT)
Dept: HEALTH INFORMATION MANAGEMENT | Facility: CLINIC | Age: 68
End: 2023-11-27

## 2023-11-27 DIAGNOSIS — E03.9 HYPOTHYROIDISM, UNSPECIFIED: ICD-10-CM

## 2023-11-27 DIAGNOSIS — Z12.5 ENCOUNTER FOR SCREENING FOR MALIGNANT NEOPLASM OF PROSTATE: ICD-10-CM

## 2023-11-27 DIAGNOSIS — I10 ESSENTIAL (PRIMARY) HYPERTENSION: ICD-10-CM

## 2023-11-27 DIAGNOSIS — E78.6 LIPOPROTEIN DEFICIENCY: ICD-10-CM

## 2023-11-27 LAB
ANION GAP SERPL CALCULATED.3IONS-SCNC: 11 MMOL/L (ref 7–15)
BUN SERPL-MCNC: 16.8 MG/DL (ref 8–23)
CALCIUM SERPL-MCNC: 9.3 MG/DL (ref 8.8–10.2)
CHLORIDE SERPL-SCNC: 102 MMOL/L (ref 98–107)
CHOLEST SERPL-MCNC: 175 MG/DL
CREAT SERPL-MCNC: 0.95 MG/DL (ref 0.67–1.17)
DEPRECATED HCO3 PLAS-SCNC: 25 MMOL/L (ref 22–29)
EGFRCR SERPLBLD CKD-EPI 2021: 88 ML/MIN/1.73M2
GLUCOSE SERPL-MCNC: 113 MG/DL (ref 70–99)
HDLC SERPL-MCNC: 39 MG/DL
LDLC SERPL CALC-MCNC: 120 MG/DL
NONHDLC SERPL-MCNC: 136 MG/DL
POTASSIUM SERPL-SCNC: 4.3 MMOL/L (ref 3.4–5.3)
PSA SERPL DL<=0.01 NG/ML-MCNC: 0.56 NG/ML (ref 0–4.5)
SODIUM SERPL-SCNC: 138 MMOL/L (ref 135–145)
TRIGL SERPL-MCNC: 78 MG/DL
TSH SERPL DL<=0.005 MIU/L-ACNC: 0.16 UIU/ML (ref 0.3–4.2)

## 2023-11-27 PROCEDURE — 80061 LIPID PANEL: CPT | Mod: ORL | Performed by: FAMILY MEDICINE

## 2023-11-27 PROCEDURE — 80048 BASIC METABOLIC PNL TOTAL CA: CPT | Mod: ORL | Performed by: FAMILY MEDICINE

## 2023-11-27 PROCEDURE — G0103 PSA SCREENING: HCPCS | Mod: ORL | Performed by: FAMILY MEDICINE

## 2023-11-27 PROCEDURE — 84443 ASSAY THYROID STIM HORMONE: CPT | Mod: ORL | Performed by: FAMILY MEDICINE

## 2023-12-27 DIAGNOSIS — E89.0 POSTABLATIVE HYPOTHYROIDISM: ICD-10-CM

## 2023-12-27 NOTE — TELEPHONE ENCOUNTER
Requested Prescriptions   Pending Prescriptions Disp Refills    levothyroxine (SYNTHROID/LEVOTHROID) 75 MCG tablet [Pharmacy Med Name: LEVOTHYROXINE 75 MCG TABLET] 144 tablet 0     Sig: TAKE ONE TABLET 2 DAYS A WEEK AND TWO TABLETS 5 DAYS A WEEK       Thyroid Protocol Failed - 12/27/2023 10:46 AM        Failed - Normal TSH on file in past 12 months     Recent Labs   Lab Test 11/27/23  0952   TSH 0.16*              Passed - Patient is 12 years or older        Passed - Recent (12 mo) or future (30 days) visit within the authorizing provider's specialty     The patient must have completed an in-person or virtual visit within the past 12 months or has a future visit scheduled within the next 90 days with the authorizing provider s specialty.  Urgent care and e-visits do not quality as an office visit for this protocol.          Passed - Medication is active on med list

## 2024-01-01 RX ORDER — LEVOTHYROXINE SODIUM 75 UG/1
TABLET ORAL
Qty: 144 TABLET | Refills: 0 | Status: SHIPPED | OUTPATIENT
Start: 2024-01-01 | End: 2024-01-25

## 2024-01-15 ENCOUNTER — LAB (OUTPATIENT)
Dept: LAB | Facility: CLINIC | Age: 69
End: 2024-01-15
Payer: MEDICARE

## 2024-01-15 DIAGNOSIS — Z83.3 FAMILY HISTORY OF DIABETES MELLITUS: ICD-10-CM

## 2024-01-15 DIAGNOSIS — E89.0 POSTABLATIVE HYPOTHYROIDISM: ICD-10-CM

## 2024-01-15 LAB
ALBUMIN SERPL BCG-MCNC: 4.3 G/DL (ref 3.5–5.2)
ALP SERPL-CCNC: 48 U/L (ref 40–150)
ALT SERPL W P-5'-P-CCNC: 61 U/L (ref 0–70)
ANION GAP SERPL CALCULATED.3IONS-SCNC: 10 MMOL/L (ref 7–15)
AST SERPL W P-5'-P-CCNC: 41 U/L (ref 0–45)
BILIRUB SERPL-MCNC: 1 MG/DL
BUN SERPL-MCNC: 9.7 MG/DL (ref 8–23)
CALCIUM SERPL-MCNC: 9.4 MG/DL (ref 8.8–10.2)
CHLORIDE SERPL-SCNC: 103 MMOL/L (ref 98–107)
CHOLEST SERPL-MCNC: 188 MG/DL
CREAT SERPL-MCNC: 0.96 MG/DL (ref 0.67–1.17)
DEPRECATED HCO3 PLAS-SCNC: 25 MMOL/L (ref 22–29)
EGFRCR SERPLBLD CKD-EPI 2021: 87 ML/MIN/1.73M2
FASTING STATUS PATIENT QL REPORTED: YES
GLUCOSE SERPL-MCNC: 119 MG/DL (ref 70–99)
HBA1C MFR BLD: 5.7 % (ref 0–5.6)
HDLC SERPL-MCNC: 33 MG/DL
LDLC SERPL CALC-MCNC: 132 MG/DL
NONHDLC SERPL-MCNC: 155 MG/DL
POTASSIUM SERPL-SCNC: 4.1 MMOL/L (ref 3.4–5.3)
PROT SERPL-MCNC: 7.2 G/DL (ref 6.4–8.3)
SODIUM SERPL-SCNC: 138 MMOL/L (ref 135–145)
T4 FREE SERPL-MCNC: 1.76 NG/DL (ref 0.9–1.7)
TRIGL SERPL-MCNC: 113 MG/DL
TSH SERPL DL<=0.005 MIU/L-ACNC: 0.19 UIU/ML (ref 0.3–4.2)

## 2024-01-15 PROCEDURE — 83036 HEMOGLOBIN GLYCOSYLATED A1C: CPT

## 2024-01-15 PROCEDURE — 80061 LIPID PANEL: CPT

## 2024-01-15 PROCEDURE — 80053 COMPREHEN METABOLIC PANEL: CPT

## 2024-01-15 PROCEDURE — 84443 ASSAY THYROID STIM HORMONE: CPT

## 2024-01-15 PROCEDURE — 84439 ASSAY OF FREE THYROXINE: CPT

## 2024-01-15 PROCEDURE — 36415 COLL VENOUS BLD VENIPUNCTURE: CPT

## 2024-01-25 ENCOUNTER — OFFICE VISIT (OUTPATIENT)
Dept: ENDOCRINOLOGY | Facility: CLINIC | Age: 69
End: 2024-01-25
Payer: MEDICARE

## 2024-01-25 ENCOUNTER — TELEPHONE (OUTPATIENT)
Dept: ENDOCRINOLOGY | Facility: CLINIC | Age: 69
End: 2024-01-25

## 2024-01-25 VITALS
BODY MASS INDEX: 31.87 KG/M2 | HEART RATE: 80 BPM | WEIGHT: 235 LBS | SYSTOLIC BLOOD PRESSURE: 139 MMHG | DIASTOLIC BLOOD PRESSURE: 93 MMHG | OXYGEN SATURATION: 93 %

## 2024-01-25 DIAGNOSIS — R73.03 PRE-DIABETES: ICD-10-CM

## 2024-01-25 DIAGNOSIS — E89.0 POSTABLATIVE HYPOTHYROIDISM: Primary | ICD-10-CM

## 2024-01-25 DIAGNOSIS — E66.9 OBESITY WITH BODY MASS INDEX 30 OR GREATER: ICD-10-CM

## 2024-01-25 PROBLEM — D12.0 BENIGN NEOPLASM OF CECUM: Status: ACTIVE | Noted: 2024-01-17

## 2024-01-25 PROBLEM — K64.9 HEMORRHOIDS: Status: ACTIVE | Noted: 2024-01-15

## 2024-01-25 PROBLEM — K57.30 DIVERTICULAR DISEASE OF LARGE INTESTINE: Status: ACTIVE | Noted: 2024-01-15

## 2024-01-25 PROCEDURE — 99214 OFFICE O/P EST MOD 30 MIN: CPT | Performed by: NURSE PRACTITIONER

## 2024-01-25 RX ORDER — LEVOTHYROXINE SODIUM 75 UG/1
TABLET ORAL
Qty: 145 TABLET | Refills: 3 | Status: SHIPPED | OUTPATIENT
Start: 2024-01-25

## 2024-01-25 RX ORDER — FLUOROURACIL 50 MG/G
CREAM TOPICAL
COMMUNITY
Start: 2023-10-26 | End: 2024-08-08

## 2024-01-25 RX ORDER — METHYLPREDNISOLONE 4 MG
TABLET, DOSE PACK ORAL
COMMUNITY
Start: 2023-12-04 | End: 2024-08-08

## 2024-01-25 RX ORDER — MOMETASONE FUROATE 1 MG/G
CREAM TOPICAL
COMMUNITY
Start: 2023-11-10 | End: 2024-08-08

## 2024-01-25 RX ORDER — NEOMYCIN SULFATE, POLYMYXIN B SULFATE, AND DEXAMETHASONE 3.5; 10000; 1 MG/G; [USP'U]/G; MG/G
OINTMENT OPHTHALMIC
COMMUNITY
Start: 2023-12-01 | End: 2024-08-08

## 2024-01-25 NOTE — TELEPHONE ENCOUNTER
Zepbound denied, please refer to denial letter. Please advise on how you would like to proceed. If appealed please provide medical rational.

## 2024-01-25 NOTE — TELEPHONE ENCOUNTER
PA Initiation    Medication: ZEPBOUND 2.5 MG/0.5ML SC SOAJ  Insurance Company: [employer] - Phone [phone] Fax [phone]  Pharmacy Filling the Rx:    Filling Pharmacy Phone:    Filling Pharmacy Fax:    Start Date: 1/25/2024    Key: [id number]

## 2024-01-25 NOTE — TELEPHONE ENCOUNTER
PRIOR AUTHORIZATION DENIED    Medication: ZEPBOUND 2.5 MG/0.5ML SC SOAJ  Insurance Company: [employer] - Phone [phone] Fax [phone]  Denial Date: 1/25/2024  Denial Reason(s):   Appeal Information:

## 2024-01-25 NOTE — PROGRESS NOTES
"[facility] ENDOCRINOLOGY    Thyroid Note  1/25/2024    Fred Sanches, 1955, [id number]          Reason for visit      1. Postablative hypothyroidism    2. Obesity with body mass index 30 or greater        HPI     Fred Sanches is a very pleasant 68 year old old male who presents for follow up.  SUMMARY:    Letitia is here today in follow-up for Postablative Hypothyroidism and Obesity.     Current TSH is 0.19 and fT4 is 1.76. He is taking Levothyroxine, 75 mcg 2 days a week and 150 mcg 5 days a week. He is having no problems referable to his neck.     We have been trying to get his TSH down below 1, to enable him to be successful with weight loss. He has been unable to lose weight even with the changes.     We recently got an A1c and it was 5.7. He reports that \"quite a few people in my family have Type 2 DM\" and he is interested (still) in getting some weight off to help with this.     He reports that he remains active.    Past Medical History     Patient Active Problem List   Diagnosis    Hypertension    Allergic Rhinitis    Thyrotoxic exophthalmos    Graves' Disease    Postablative hypothyroidism    Lipoprotein deficiency disorder    Obesity with body mass index 30 or greater    Obstructive sleep apnea    Benign neoplasm of ascending colon    Chronic kidney disease, stage 2 (mild)    History of colon polyps    Benign neoplasm of cecum    Diverticular disease of large intestine    Hemorrhoids       Family History       family history is not on file.    Social History      reports that he has never smoked. He has never used smokeless tobacco.      Review of Systems     Patient denies fatigue, weight changes, heat/cold intolerance, bowel/skin changes or CVS symptoms.   Remainder per HPI and per attached intake form.      Vital Signs     BP (!) 139/93 (BP Location: Left arm, Patient Position: Sitting, Cuff Size: Adult Regular)   Pulse 80   Wt 106.6 kg (235 lb)   SpO2 93%   BMI 31.87 " "kg/m    Wt Readings from Last 3 Encounters:   01/25/24 106.6 kg (235 lb)   07/20/23 108.3 kg (238 lb 11.2 oz)   01/05/23 110.7 kg (244 lb)       Physical Exam     General:  Normal, NIRD,appears euthyroid  Eyes:  Pupils equal, round and reactive to light; no proptosis, lid lag or  periorbital edema.  Thyroid:  Thyroid is non-palpable.  No tenderness or bruit  Neck: No lymph nodes  Musculoskeletal:  Muscle strength grossly normal without evidence of wasting.  Heart:  Regular rate and rhythm without murmur.  Lungs:  Clear to auscultation.  Abdomen: Soft, non-tender, no masses or organomegaly  Neuro: Patella Reflexes were normal.No tremors  Skin:  No acanthosis nigricans or vitiligo        Assessment     1. Postablative hypothyroidism    2. Obesity with body mass index 30 or greater            Plan     His suppressed TSH is not helping his ability to lose weight. I would like to see it closer to 1. Adjusted dose from 150 mcg 5 days a week to 4 days a week, and will increase his 75 mcg days to 3 days a week.     With discussion, will try and get started on Zepbound. Hopefully, we can \"kill two birds with one stone\" and not only help with weight loss, but also with BG management.     Follow-up with me in 6 months.         Belén Jacobo NP  [facility]  1/25/2024  10:20 AM      Lab Results     TSH   Date Value Ref Range Status   01/15/2024 0.19 (L) 0.30 - 4.20 uIU/mL Final   06/28/2022 1.41 0.30 - 5.00 uIU/mL Final     No components found for: \"THYROIDAB\"    No results found for: \"U4NNGEX\"    Imaging Results   Last thyroid ultrasound:  No results found for this or any previous visit.      Last thyroid nuclear scan:  Results for orders placed in visit on 06/19/13    NM Thyroid Uptake and Scan    Narrative  See Historical Hospital Medical Record for documentation      Current Medications     Outpatient Medications Prior to Visit   Medication Sig Dispense Refill    fluorouracil (EFUDEX) 5 % external cream 1 APPLICATION " TWICE A DAY TOPICALLY APPLY TO AFFECTED AREAS OF THE ARMS TWICE DAILY FOR 1 MONTH      methylPREDNISolone (MEDROL DOSEPAK) 4 MG tablet therapy pack TAKE 6 TABLETS ON DAY 1 AS DIRECTED ON PACKAGE AND DECREASE BY 1 TAB EACH DAY FOR A TOTAL OF 6 DAYS      mometasone (ELOCON) 0.1 % external cream 1 APPLICATION TWICE A DAY AS NEEDED TOPICALLY APPLY TO AFFECTED AREA TWICE DAILY FOR 5-7 DAYS      neomycin-polymyxin-dexAMETHasone (MAXITROL) 3.5-93003-1.1 ophthalmic ointment 1 A SMALL AMOUNT INTO AFFECTED EYE THREE TIMES A DAY      fluticasone (FLONASE) 50 mcg/actuation nasal spray [FLUTICASONE (FLONASE) 50 MCG/ACTUATION NASAL SPRAY] 2 sprays into each nostril as needed for rhinitis.      losartan-hydrochlorothiazide (HYZAAR) 50-12.5 mg per tablet [LOSARTAN-HYDROCHLOROTHIAZIDE (HYZAAR) 50-12.5 MG PER TABLET] Take 1 tablet by mouth daily.      levothyroxine (SYNTHROID/LEVOTHROID) 75 MCG tablet TAKE ONE TABLET 2 DAYS A WEEK AND TWO TABLETS 5 DAYS A WEEK 144 tablet 0     No facility-administered medications prior to visit.

## 2024-01-25 NOTE — LETTER
"    1/25/2024         RE: Fred Sanches  [address] [address]        Dear Colleague,    Thank you for referring your patient, Fred Sanches, to the [facility] [place]. Please see a copy of my visit note below.    [facility] ENDOCRINOLOGY    Thyroid Note  1/25/2024    Fred Sanches, 1955, [id number]          Reason for visit      1. Postablative hypothyroidism    2. Obesity with body mass index 30 or greater        HPI     Fred Sanches is a very pleasant 68 year old old male who presents for follow up.  SUMMARY:    Letitia is here today in follow-up for Postablative Hypothyroidism and Obesity.     Current TSH is 0.19 and fT4 is 1.76. He is taking Levothyroxine, 75 mcg 2 days a week and 150 mcg 5 days a week. He is having no problems referable to his neck.     We have been trying to get his TSH down below 1, to enable him to be successful with weight loss. He has been unable to lose weight even with the changes.     We recently got an A1c and it was 5.7. He reports that \"quite a few people in my family have Type 2 DM\" and he is interested (still) in getting some weight off to help with this.     He reports that he remains active.    Past Medical History     Patient Active Problem List   Diagnosis     Hypertension     Allergic Rhinitis     Thyrotoxic exophthalmos     Graves' Disease     Postablative hypothyroidism     Lipoprotein deficiency disorder     Obesity with body mass index 30 or greater     Obstructive sleep apnea     Benign neoplasm of ascending colon     Chronic kidney disease, stage 2 (mild)     History of colon polyps     Benign neoplasm of cecum     Diverticular disease of large intestine     Hemorrhoids       Family History       family history is not on file.    Social History      reports that he has never smoked. He has never used smokeless tobacco.      Review of Systems     Patient denies fatigue, weight changes, " "heat/cold intolerance, bowel/skin changes or CVS symptoms.   Remainder per HPI and per attached intake form.      Vital Signs     BP (!) 139/93 (BP Location: Left arm, Patient Position: Sitting, Cuff Size: Adult Regular)   Pulse 80   Wt 106.6 kg (235 lb)   SpO2 93%   BMI 31.87 kg/m    Wt Readings from Last 3 Encounters:   01/25/24 106.6 kg (235 lb)   07/20/23 108.3 kg (238 lb 11.2 oz)   01/05/23 110.7 kg (244 lb)       Physical Exam     General:  Normal, NIRD,appears euthyroid  Eyes:  Pupils equal, round and reactive to light; no proptosis, lid lag or  periorbital edema.  Thyroid:  Thyroid is non-palpable.  No tenderness or bruit  Neck: No lymph nodes  Musculoskeletal:  Muscle strength grossly normal without evidence of wasting.  Heart:  Regular rate and rhythm without murmur.  Lungs:  Clear to auscultation.  Abdomen: Soft, non-tender, no masses or organomegaly  Neuro: Patella Reflexes were normal.No tremors  Skin:  No acanthosis nigricans or vitiligo        Assessment     1. Postablative hypothyroidism    2. Obesity with body mass index 30 or greater            Plan     His suppressed TSH is not helping his ability to lose weight. I would like to see it closer to 1. Adjusted dose from 150 mcg 5 days a week to 4 days a week, and will increase his 75 mcg days to 3 days a week.     With discussion, will try and get started on Zepbound. Hopefully, we can \"kill two birds with one stone\" and not only help with weight loss, but also with BG management.     Follow-up with me in 6 months.         Belén Jacobo NP  [facility]  1/25/2024  10:20 AM      Lab Results     TSH   Date Value Ref Range Status   01/15/2024 0.19 (L) 0.30 - 4.20 uIU/mL Final   06/28/2022 1.41 0.30 - 5.00 uIU/mL Final     No components found for: \"THYROIDAB\"    No results found for: \"E4FNBNX\"    Imaging Results   Last thyroid ultrasound:  No results found for this or any previous visit.      Last thyroid nuclear scan:  Results for orders " placed in visit on 06/19/13    NM Thyroid Uptake and Scan    Narrative  See Historical Hospital Medical Record for documentation      Current Medications     Outpatient Medications Prior to Visit   Medication Sig Dispense Refill     fluorouracil (EFUDEX) 5 % external cream 1 APPLICATION TWICE A DAY TOPICALLY APPLY TO AFFECTED AREAS OF THE ARMS TWICE DAILY FOR 1 MONTH       methylPREDNISolone (MEDROL DOSEPAK) 4 MG tablet therapy pack TAKE 6 TABLETS ON DAY 1 AS DIRECTED ON PACKAGE AND DECREASE BY 1 TAB EACH DAY FOR A TOTAL OF 6 DAYS       mometasone (ELOCON) 0.1 % external cream 1 APPLICATION TWICE A DAY AS NEEDED TOPICALLY APPLY TO AFFECTED AREA TWICE DAILY FOR 5-7 DAYS       neomycin-polymyxin-dexAMETHasone (MAXITROL) 3.5-52640-4.1 ophthalmic ointment 1 A SMALL AMOUNT INTO AFFECTED EYE THREE TIMES A DAY       fluticasone (FLONASE) 50 mcg/actuation nasal spray [FLUTICASONE (FLONASE) 50 MCG/ACTUATION NASAL SPRAY] 2 sprays into each nostril as needed for rhinitis.       losartan-hydrochlorothiazide (HYZAAR) 50-12.5 mg per tablet [LOSARTAN-HYDROCHLOROTHIAZIDE (HYZAAR) 50-12.5 MG PER TABLET] Take 1 tablet by mouth daily.       levothyroxine (SYNTHROID/LEVOTHROID) 75 MCG tablet TAKE ONE TABLET 2 DAYS A WEEK AND TWO TABLETS 5 DAYS A WEEK 144 tablet 0     No facility-administered medications prior to visit.         Again, thank you for allowing me to participate in the care of your patient.        Sincerely,        Belén Jacobo NP

## 2024-03-02 ENCOUNTER — HEALTH MAINTENANCE LETTER (OUTPATIENT)
Age: 69
End: 2024-03-02

## 2024-03-26 ENCOUNTER — TELEPHONE (OUTPATIENT)
Dept: ENDOCRINOLOGY | Facility: CLINIC | Age: 69
End: 2024-03-26

## 2024-03-26 DIAGNOSIS — E89.0 POSTABLATIVE HYPOTHYROIDISM: ICD-10-CM

## 2024-03-26 NOTE — TELEPHONE ENCOUNTER
Requested Prescriptions   Pending Prescriptions Disp Refills    levothyroxine (SYNTHROID/LEVOTHROID) 75 MCG tablet [Pharmacy Med Name: LEVOTHYROXINE 75 MCG TABLET] 144 tablet      Sig: TAKE ONE TABLET 2 DAYS A WEEK AND TWO TABLETS 5 DAYS A WEEK       Thyroid Protocol Failed - 3/26/2024 12:58 AM        Failed - Normal TSH on file in past 12 months     Recent Labs   Lab Test 01/15/24  0722   TSH 0.19*              Passed - Patient is 12 years or older        Passed - Recent (12 mo) or future (30 days) visit within the authorizing provider's specialty     The patient must have completed an in-person or virtual visit within the past 12 months or has a future visit scheduled within the next 90 days with the authorizing provider s specialty.  Urgent care and e-visits do not quality as an office visit for this protocol.          Passed - Medication is active on med list

## 2024-03-29 RX ORDER — LEVOTHYROXINE SODIUM 75 UG/1
TABLET ORAL
Qty: 144 TABLET | OUTPATIENT
Start: 2024-03-29

## 2024-03-29 NOTE — TELEPHONE ENCOUNTER
I called the pt and informed that due to his dose change the recommendation is to have labs in 2 mo (which is now). The pt states that this was never mentioned to him and he does not think changing one pill would require repeat labs. Pt is not willing to have repeat labs and he was provided one year of refills on the RX sent 1/25/24,.

## 2024-03-29 NOTE — TELEPHONE ENCOUNTER
Patient calling stating he had labs in January, why does he needs labs now? Patient states insurance won't pay for labs right now cause it's not 6 months. He does have an appt in August with Belén Jacobo and has scheduled labs prior to seeing her then. Please call him back to discuss why the medication was refused as soon as you can.

## 2024-03-29 NOTE — TELEPHONE ENCOUNTER
Patient requesting a call back to discuss issue with medication and [facility] pharmacy. Please call him back at [phone] thank you

## 2024-03-29 NOTE — TELEPHONE ENCOUNTER
03.29- lmtcb-  Pt needs labs now for a refill        Follow up appointment already scheduled for August.

## 2024-07-09 ENCOUNTER — LAB REQUISITION (OUTPATIENT)
Dept: LAB | Facility: CLINIC | Age: 69
End: 2024-07-09
Payer: MEDICARE

## 2024-07-09 DIAGNOSIS — E03.9 HYPOTHYROIDISM, UNSPECIFIED: ICD-10-CM

## 2024-07-09 DIAGNOSIS — I10 ESSENTIAL (PRIMARY) HYPERTENSION: ICD-10-CM

## 2024-07-09 LAB
ANION GAP SERPL CALCULATED.3IONS-SCNC: 14 MMOL/L (ref 7–15)
BUN SERPL-MCNC: 15.5 MG/DL (ref 8–23)
CALCIUM SERPL-MCNC: 9.3 MG/DL (ref 8.8–10.2)
CHLORIDE SERPL-SCNC: 101 MMOL/L (ref 98–107)
CREAT SERPL-MCNC: 0.93 MG/DL (ref 0.67–1.17)
DEPRECATED HCO3 PLAS-SCNC: 22 MMOL/L (ref 22–29)
EGFRCR SERPLBLD CKD-EPI 2021: >90 ML/MIN/1.73M2
GLUCOSE SERPL-MCNC: 127 MG/DL (ref 70–99)
POTASSIUM SERPL-SCNC: 3.8 MMOL/L (ref 3.4–5.3)
SODIUM SERPL-SCNC: 137 MMOL/L (ref 135–145)
TSH SERPL DL<=0.005 MIU/L-ACNC: 1.49 UIU/ML (ref 0.3–4.2)

## 2024-07-09 PROCEDURE — 84443 ASSAY THYROID STIM HORMONE: CPT | Mod: ORL | Performed by: FAMILY MEDICINE

## 2024-07-09 PROCEDURE — 80048 BASIC METABOLIC PNL TOTAL CA: CPT | Mod: ORL | Performed by: FAMILY MEDICINE

## 2024-08-01 ENCOUNTER — LAB (OUTPATIENT)
Dept: LAB | Facility: CLINIC | Age: 69
End: 2024-08-01
Payer: MEDICARE

## 2024-08-01 DIAGNOSIS — R73.03 PRE-DIABETES: ICD-10-CM

## 2024-08-01 DIAGNOSIS — E89.0 POSTABLATIVE HYPOTHYROIDISM: ICD-10-CM

## 2024-08-01 DIAGNOSIS — E66.9 OBESITY WITH BODY MASS INDEX 30 OR GREATER: ICD-10-CM

## 2024-08-01 LAB
ANION GAP SERPL CALCULATED.3IONS-SCNC: 9 MMOL/L (ref 7–15)
BUN SERPL-MCNC: 14.4 MG/DL (ref 8–23)
CALCIUM SERPL-MCNC: 9.6 MG/DL (ref 8.8–10.4)
CHLORIDE SERPL-SCNC: 104 MMOL/L (ref 98–107)
CREAT SERPL-MCNC: 0.94 MG/DL (ref 0.67–1.17)
EGFRCR SERPLBLD CKD-EPI 2021: 89 ML/MIN/1.73M2
GLUCOSE SERPL-MCNC: 128 MG/DL (ref 70–99)
HBA1C MFR BLD: 5.8 % (ref 0–5.6)
HCO3 SERPL-SCNC: 26 MMOL/L (ref 22–29)
POTASSIUM SERPL-SCNC: 3.9 MMOL/L (ref 3.4–5.3)
SODIUM SERPL-SCNC: 139 MMOL/L (ref 135–145)
T4 FREE SERPL-MCNC: 1.64 NG/DL (ref 0.9–1.7)
TSH SERPL DL<=0.005 MIU/L-ACNC: 0.4 UIU/ML (ref 0.3–4.2)

## 2024-08-01 PROCEDURE — 80048 BASIC METABOLIC PNL TOTAL CA: CPT

## 2024-08-01 PROCEDURE — 36415 COLL VENOUS BLD VENIPUNCTURE: CPT

## 2024-08-01 PROCEDURE — 84439 ASSAY OF FREE THYROXINE: CPT

## 2024-08-01 PROCEDURE — 83036 HEMOGLOBIN GLYCOSYLATED A1C: CPT

## 2024-08-01 PROCEDURE — 84443 ASSAY THYROID STIM HORMONE: CPT

## 2024-08-08 ENCOUNTER — VIRTUAL VISIT (OUTPATIENT)
Dept: ENDOCRINOLOGY | Facility: CLINIC | Age: 69
End: 2024-08-08
Payer: MEDICARE

## 2024-08-08 DIAGNOSIS — E89.0 POSTABLATIVE HYPOTHYROIDISM: Primary | ICD-10-CM

## 2024-08-08 PROCEDURE — 99214 OFFICE O/P EST MOD 30 MIN: CPT | Mod: 95 | Performed by: NURSE PRACTITIONER

## 2024-08-08 NOTE — LETTER
"8/8/2024      Fred Sanches  [address] [address]      Dear Colleague,    Thank you for referring your patient, Fred Sanches, to the [facility] [place]. Please see a copy of my visit note below.    [facility] ENDOCRINOLOGY    Thyroid Note  8/8/2024    Fred Sanches, 1955, [id number]          Reason for visit      1. Postablative hypothyroidism        HPI     Fred Sanches is a very pleasant 68 year old old male who presents for follow up.  SUMMARY:    Letitia is seen today via Video visit in follow-up for postablative Hypothyroidism. He is feeling well. He has been working on some weight loss and is down \"8-10 lbs\". His current TSH is 0.40 and fT4 is 1.64. He is taking Levothyroxine (75 mcg tablets) 2 tablets 4 days a week and 1 tablet \"Fri, Sat and Sun\". He is having no problems referable to his neck.     Renal function remains within normal limits.     HgbA1c remains in the \"pre-Diabetes\" range.     Past Medical History     Patient Active Problem List   Diagnosis     Hypertension     Allergic Rhinitis     Thyrotoxic exophthalmos     Graves' Disease     Postablative hypothyroidism     Lipoprotein deficiency disorder     Obesity with body mass index 30 or greater     Obstructive sleep apnea     Benign neoplasm of ascending colon     Chronic kidney disease, stage 2 (mild)     History of colon polyps     Benign neoplasm of cecum     Diverticular disease of large intestine     Hemorrhoids       Family History       family history is not on file.    Social History      reports that he has never smoked. He has never used smokeless tobacco.      Review of Systems     Patient denies fatigue, weight changes, heat/cold intolerance, bowel/skin changes or CVS symptoms.   Remainder per HPI and per attached intake form.      Vital Signs     There were no vitals taken for this visit.  Wt Readings from Last 3 Encounters:   01/25/24 106.6 kg " "(235 lb)   07/20/23 108.3 kg (238 lb 11.2 oz)   01/05/23 110.7 kg (244 lb)       Physical Exam     Constitutional:  Well developed, Well nourished  HENT:  Normocephalic,   Neck: normal in appearance  Eyes:  PERRL, Conjunctiva pink  Respiratory:  No respiratory distress  Skin: No acanthosis nigricans, lipoatrophy or lipodystrophy  Neurologic:  Alert & oriented x 3, nonfocal  Psychiatric:  Affect, Mood, Insight appropriate        Assessment     1. Postablative hypothyroidism            Plan     Letitia is bio-chemically and physically euthyroid. He will continue on his current Levothyroxine regimen and will follow-up with me in 6 months.         Belén Jacobo NP  [facility]  8/8/2024  7:33 AM      Lab Results     TSH   Date Value Ref Range Status   08/01/2024 0.40 0.30 - 4.20 uIU/mL Final   06/28/2022 1.41 0.30 - 5.00 uIU/mL Final     No components found for: \"THYROIDAB\"    No results found for: \"Y5HGZKH\"    Imaging Results   Last thyroid ultrasound:  No results found for this or any previous visit.      Last thyroid nuclear scan:  Results for orders placed in visit on 06/19/13    NM Thyroid Uptake and Scan    Narrative  See Historical Hospital Medical Record for documentation      Current Medications     Outpatient Medications Prior to Visit   Medication Sig Dispense Refill     fluticasone (FLONASE) 50 mcg/actuation nasal spray [FLUTICASONE (FLONASE) 50 MCG/ACTUATION NASAL SPRAY] 2 sprays into each nostril as needed for rhinitis.       levothyroxine (SYNTHROID/LEVOTHROID) 75 MCG tablet Take one tablet 3 days a week and two tablets 4 days a week 145 tablet 3     losartan-hydrochlorothiazide (HYZAAR) 50-12.5 mg per tablet [LOSARTAN-HYDROCHLOROTHIAZIDE (HYZAAR) 50-12.5 MG PER TABLET] Take 1 tablet by mouth daily.       fluorouracil (EFUDEX) 5 % external cream 1 APPLICATION TWICE A DAY TOPICALLY APPLY TO AFFECTED AREAS OF THE ARMS TWICE DAILY FOR 1 MONTH (Patient not taking: Reported on 8/8/2024)       " "methylPREDNISolone (MEDROL DOSEPAK) 4 MG tablet therapy pack TAKE 6 TABLETS ON DAY 1 AS DIRECTED ON PACKAGE AND DECREASE BY 1 TAB EACH DAY FOR A TOTAL OF 6 DAYS (Patient not taking: Reported on 8/8/2024)       mometasone (ELOCON) 0.1 % external cream 1 APPLICATION TWICE A DAY AS NEEDED TOPICALLY APPLY TO AFFECTED AREA TWICE DAILY FOR 5-7 DAYS (Patient not taking: Reported on 8/8/2024)       neomycin-polymyxin-dexAMETHasone (MAXITROL) 3.5-15782-4.1 ophthalmic ointment 1 A SMALL AMOUNT INTO AFFECTED EYE THREE TIMES A DAY (Patient not taking: Reported on 8/8/2024)       tirzepatide-Weight Management (ZEPBOUND) 2.5 MG/0.5ML prefilled pen Inject 0.5 mLs (2.5 mg) Subcutaneous every 7 days (Patient not taking: Reported on 8/8/2024) 2 mL 1     No facility-administered medications prior to visit.           Virtual Visit Details    Type of service:  Video Visit   Video Start Time: {video visit start/end time for provider to select:702868}  Video End Time:{video visit start/end time for provider to select:677070}    Originating Location (pt. Location): {video visit patient location:624858::\"Home\"}  {PROVIDER LOCATION On-site should be selected for visits conducted from your clinic location or adjoining [facility] hospital, academic office, or other nearby [facility] building. Off-site should be selected for all other provider locations, including home:893245}  Distant Location (provider location):  {virtual location provider:157792}  Platform used for Video Visit: {Virtual Visit Platforms:711773::\"[email or web address]\"}       Again, thank you for allowing me to participate in the care of your patient.        Sincerely,        Belén Jacobo NP  "

## 2024-08-08 NOTE — PROGRESS NOTES
"[facility] ENDOCRINOLOGY    Thyroid Note  8/8/2024    Fred Sanches, 1955, [id number]          Reason for visit      1. Postablative hypothyroidism        HPI     Fred Sanches is a very pleasant 68 year old old male who presents for follow up.  SUMMARY:    Letitia is seen today via Video visit in follow-up for postablative Hypothyroidism. He is feeling well. He has been working on some weight loss and is down \"8-10 lbs\". His current TSH is 0.40 and fT4 is 1.64. He is taking Levothyroxine (75 mcg tablets) 2 tablets 4 days a week and 1 tablet \"Fri, Sat and Sun\". He is having no problems referable to his neck.     Renal function remains within normal limits.     HgbA1c remains in the \"pre-Diabetes\" range.     Past Medical History     Patient Active Problem List   Diagnosis    Hypertension    Allergic Rhinitis    Thyrotoxic exophthalmos    Graves' Disease    Postablative hypothyroidism    Lipoprotein deficiency disorder    Obesity with body mass index 30 or greater    Obstructive sleep apnea    Benign neoplasm of ascending colon    Chronic kidney disease, stage 2 (mild)    History of colon polyps    Benign neoplasm of cecum    Diverticular disease of large intestine    Hemorrhoids       Family History       family history is not on file.    Social History      reports that he has never smoked. He has never used smokeless tobacco.      Review of Systems     Patient denies fatigue, weight changes, heat/cold intolerance, bowel/skin changes or CVS symptoms.   Remainder per HPI and per attached intake form.      Vital Signs     There were no vitals taken for this visit.  Wt Readings from Last 3 Encounters:   01/25/24 106.6 kg (235 lb)   07/20/23 108.3 kg (238 lb 11.2 oz)   01/05/23 110.7 kg (244 lb)       Physical Exam     Constitutional:  Well developed, Well nourished  HENT:  Normocephalic,   Neck: normal in appearance  Eyes:  PERRL, Conjunctiva pink  Respiratory:  No respiratory " "distress  Skin: No acanthosis nigricans, lipoatrophy or lipodystrophy  Neurologic:  Alert & oriented x 3, nonfocal  Psychiatric:  Affect, Mood, Insight appropriate        Assessment     1. Postablative hypothyroidism            Plan     Letitia is bio-chemically and physically euthyroid. He will continue on his current Levothyroxine regimen and will follow-up with me in 6 months.         Belén Jacobo NP  [facility]  8/8/2024  7:33 AM      Lab Results     TSH   Date Value Ref Range Status   08/01/2024 0.40 0.30 - 4.20 uIU/mL Final   06/28/2022 1.41 0.30 - 5.00 uIU/mL Final     No components found for: \"THYROIDAB\"    No results found for: \"B9EGJKY\"    Imaging Results   Last thyroid ultrasound:  No results found for this or any previous visit.      Last thyroid nuclear scan:  Results for orders placed in visit on 06/19/13    [facility] Thyroid Uptake and Scan    Narrative  See Historical Hospital Medical Record for documentation      Current Medications     Outpatient Medications Prior to Visit   Medication Sig Dispense Refill    fluticasone (FLONASE) 50 mcg/actuation nasal spray [FLUTICASONE (FLONASE) 50 MCG/ACTUATION NASAL SPRAY] 2 sprays into each nostril as needed for rhinitis.      levothyroxine (SYNTHROID/LEVOTHROID) 75 MCG tablet Take one tablet 3 days a week and two tablets 4 days a week 145 tablet 3    losartan-hydrochlorothiazide (HYZAAR) 50-12.5 mg per tablet [LOSARTAN-HYDROCHLOROTHIAZIDE (HYZAAR) 50-12.5 MG PER TABLET] Take 1 tablet by mouth daily.      fluorouracil (EFUDEX) 5 % external cream 1 APPLICATION TWICE A DAY TOPICALLY APPLY TO AFFECTED AREAS OF THE ARMS TWICE DAILY FOR 1 MONTH (Patient not taking: Reported on 8/8/2024)      methylPREDNISolone (MEDROL DOSEPAK) 4 MG tablet therapy pack TAKE 6 TABLETS ON DAY 1 AS DIRECTED ON PACKAGE AND DECREASE BY 1 TAB EACH DAY FOR A TOTAL OF 6 DAYS (Patient not taking: Reported on 8/8/2024)      mometasone (ELOCON) 0.1 % external cream 1 APPLICATION TWICE A DAY AS " NEEDED TOPICALLY APPLY TO AFFECTED AREA TWICE DAILY FOR 5-7 DAYS (Patient not taking: Reported on 8/8/2024)      neomycin-polymyxin-dexAMETHasone (MAXITROL) 3.5-34698-6.1 ophthalmic ointment 1 A SMALL AMOUNT INTO AFFECTED EYE THREE TIMES A DAY (Patient not taking: Reported on 8/8/2024)      tirzepatide-Weight Management (ZEPBOUND) 2.5 MG/0.5ML prefilled pen Inject 0.5 mLs (2.5 mg) Subcutaneous every 7 days (Patient not taking: Reported on 8/8/2024) 2 mL 1     No facility-administered medications prior to visit.           Virtual Visit Details    Type of service:  Video Visit   Video Start Time:  0730  Video End Time: 0750    Originating Location (pt. Location): Home    Distant Location (provider location):  On-site  Platform used for Video Visit: Dawood

## 2025-02-04 ENCOUNTER — LAB (OUTPATIENT)
Dept: LAB | Facility: CLINIC | Age: 70
End: 2025-02-04
Payer: MEDICARE

## 2025-02-04 DIAGNOSIS — E89.0 POSTABLATIVE HYPOTHYROIDISM: ICD-10-CM

## 2025-02-04 LAB
ANION GAP SERPL CALCULATED.3IONS-SCNC: 13 MMOL/L (ref 7–15)
BUN SERPL-MCNC: 16.3 MG/DL (ref 8–23)
CALCIUM SERPL-MCNC: 9.8 MG/DL (ref 8.8–10.4)
CHLORIDE SERPL-SCNC: 102 MMOL/L (ref 98–107)
CREAT SERPL-MCNC: 1.02 MG/DL (ref 0.67–1.17)
EGFRCR SERPLBLD CKD-EPI 2021: 81 ML/MIN/1.73M2
GLUCOSE SERPL-MCNC: 135 MG/DL (ref 70–99)
HCO3 SERPL-SCNC: 23 MMOL/L (ref 22–29)
POTASSIUM SERPL-SCNC: 3.9 MMOL/L (ref 3.4–5.3)
SODIUM SERPL-SCNC: 138 MMOL/L (ref 135–145)
T4 FREE SERPL-MCNC: 1.62 NG/DL (ref 0.9–1.7)
TSH SERPL DL<=0.005 MIU/L-ACNC: 0.64 UIU/ML (ref 0.3–4.2)

## 2025-02-04 PROCEDURE — 80048 BASIC METABOLIC PNL TOTAL CA: CPT

## 2025-02-04 PROCEDURE — 84443 ASSAY THYROID STIM HORMONE: CPT

## 2025-02-04 PROCEDURE — 84439 ASSAY OF FREE THYROXINE: CPT

## 2025-02-04 PROCEDURE — 36415 COLL VENOUS BLD VENIPUNCTURE: CPT

## 2025-02-06 ENCOUNTER — LAB REQUISITION (OUTPATIENT)
Dept: LAB | Facility: CLINIC | Age: 70
End: 2025-02-06
Payer: MEDICARE

## 2025-02-06 DIAGNOSIS — E78.5 HYPERLIPIDEMIA, UNSPECIFIED: ICD-10-CM

## 2025-02-06 DIAGNOSIS — I10 ESSENTIAL (PRIMARY) HYPERTENSION: ICD-10-CM

## 2025-02-06 DIAGNOSIS — Z12.5 ENCOUNTER FOR SCREENING FOR MALIGNANT NEOPLASM OF PROSTATE: ICD-10-CM

## 2025-02-06 LAB
ANION GAP SERPL CALCULATED.3IONS-SCNC: 12 MMOL/L (ref 7–15)
BUN SERPL-MCNC: 20.2 MG/DL (ref 8–23)
CALCIUM SERPL-MCNC: 9.6 MG/DL (ref 8.8–10.4)
CHLORIDE SERPL-SCNC: 101 MMOL/L (ref 98–107)
CHOLEST SERPL-MCNC: 185 MG/DL
CREAT SERPL-MCNC: 1 MG/DL (ref 0.67–1.17)
EGFRCR SERPLBLD CKD-EPI 2021: 82 ML/MIN/1.73M2
FASTING STATUS PATIENT QL REPORTED: ABNORMAL
FASTING STATUS PATIENT QL REPORTED: ABNORMAL
GLUCOSE SERPL-MCNC: 114 MG/DL (ref 70–99)
HCO3 SERPL-SCNC: 25 MMOL/L (ref 22–29)
HDLC SERPL-MCNC: 33 MG/DL
LDLC SERPL CALC-MCNC: 137 MG/DL
NONHDLC SERPL-MCNC: 152 MG/DL
POTASSIUM SERPL-SCNC: 3.9 MMOL/L (ref 3.4–5.3)
PSA SERPL DL<=0.01 NG/ML-MCNC: 0.49 NG/ML (ref 0–4.5)
SODIUM SERPL-SCNC: 138 MMOL/L (ref 135–145)
TRIGL SERPL-MCNC: 73 MG/DL

## 2025-02-06 PROCEDURE — 80061 LIPID PANEL: CPT | Mod: ORL | Performed by: FAMILY MEDICINE

## 2025-02-06 PROCEDURE — G0103 PSA SCREENING: HCPCS | Mod: ORL | Performed by: FAMILY MEDICINE

## 2025-02-06 PROCEDURE — 80048 BASIC METABOLIC PNL TOTAL CA: CPT | Mod: ORL | Performed by: FAMILY MEDICINE

## 2025-02-17 PROBLEM — F10.929 ALCOHOLIC INTOXICATION WITH COMPLICATION: Status: ACTIVE | Noted: 2024-12-07

## 2025-02-17 PROBLEM — R41.2 RETROGRADE AMNESIA: Status: ACTIVE | Noted: 2024-12-07

## 2025-02-17 PROBLEM — I60.9 SAH (SUBARACHNOID HEMORRHAGE) (H): Status: ACTIVE | Noted: 2024-12-07

## 2025-03-15 ENCOUNTER — HEALTH MAINTENANCE LETTER (OUTPATIENT)
Age: 70
End: 2025-03-15

## 2025-03-23 DIAGNOSIS — E89.0 POSTABLATIVE HYPOTHYROIDISM: ICD-10-CM

## 2025-03-24 RX ORDER — LEVOTHYROXINE SODIUM 75 UG/1
TABLET ORAL
Qty: 145 TABLET | Refills: 3 | OUTPATIENT
Start: 2025-03-24

## 2025-03-24 NOTE — TELEPHONE ENCOUNTER
Requested Prescriptions   Pending Prescriptions Disp Refills    levothyroxine (SYNTHROID/LEVOTHROID) 75 MCG tablet [Pharmacy Med Name: LEVOTHYROXINE 75 MCG TABLET] 145 tablet 3     Sig: TAKE ONE TABLET 3 DAYS A WEEK AND TWO TABLETS 4 DAYS A WEEK       Thyroid Protocol Failed - 3/24/2025 12:10 PM        Failed - Medication is active on med list and the sig matches. RN to manually verify dose and sig if red X/fail.     If the protocol passes (green check), you do not need to verify med dose and sig.    A prescription matches if they are the same clinical intention.    For Example: once daily and every morning are the same.    The protocol can not identify upper and lower case letters as matching and will fail.     For Example: Take 1 tablet (50 mg) by mouth daily     TAKE 1 TABLET (50 MG) BY MOUTH DAILY    For all fails (red x), verify dose and sig.    If the refill does match what is on file, the RN can still proceed to approve the refill request.       If they do not match, route to the appropriate provider.             Passed - Patient is 12 years or older        Passed - Recent (12 mo) or future (90 days) visit within the authorizing provider's specialty     The patient must have completed an in-person or virtual visit within the past 12 months or has a future visit scheduled within the next 90 days with the authorizing provider s specialty.  Urgent care and e-visits do not qualify as an office visit for this protocol.          Passed - Medication indicated for associated diagnosis     Medication is associated with one or more of the following diagnoses:  Hypothyroidism  Thyroid stimulating hormone suppression therapy  Thyroid cancer  Acquired atrophy of thyroid          Passed - Normal TSH on file in past 12 months     Recent Labs   Lab Test 02/04/25  0813   TSH 0.64